# Patient Record
Sex: MALE | Race: WHITE | URBAN - METROPOLITAN AREA
[De-identification: names, ages, dates, MRNs, and addresses within clinical notes are randomized per-mention and may not be internally consistent; named-entity substitution may affect disease eponyms.]

---

## 2017-01-27 ASSESSMENT — ENCOUNTER SYMPTOMS
SYNCOPE: 0
ABDOMINAL PAIN: 0
DOUBLE VISION: 1
BLOATING: 0
CLAUDICATION: 1
SINUS PAIN: 0
ORTHOPNEA: 0
TROUBLE SWALLOWING: 0
SMELL DISTURBANCE: 0
EXERCISE INTOLERANCE: 0
EYE REDNESS: 0
CONSTIPATION: 1
PALPITATIONS: 0
CHILLS: 0
LEG SWELLING: 0
TREMORS: 0
SINUS CONGESTION: 1
LIGHT-HEADEDNESS: 1
TINGLING: 1
DIARRHEA: 0
EYE IRRITATION: 0
BOWEL INCONTINENCE: 0
HEADACHES: 1
LEG PAIN: 1
WEIGHT GAIN: 0
HYPOTENSION: 0
PARALYSIS: 0
DECREASED APPETITE: 0
NUMBNESS: 1
DIZZINESS: 1
WEAKNESS: 1
RECTAL BLEEDING: 0
TACHYCARDIA: 0
FATIGUE: 1
LOSS OF CONSCIOUSNESS: 0
MEMORY LOSS: 1
NIGHT SWEATS: 0
WEIGHT LOSS: 0
NECK MASS: 1
SEIZURES: 0
TASTE DISTURBANCE: 0
ALTERED TEMPERATURE REGULATION: 1
HYPERTENSION: 1
RECTAL PAIN: 1
POLYDIPSIA: 0
INCREASED ENERGY: 0
JAUNDICE: 0
DISTURBANCES IN COORDINATION: 0
SPEECH CHANGE: 0
FEVER: 0
HOARSE VOICE: 0
HALLUCINATIONS: 0
HEARTBURN: 1
BLOOD IN STOOL: 0
SLEEP DISTURBANCES DUE TO BREATHING: 0
EYE PAIN: 0
SORE THROAT: 0
VOMITING: 0
NAUSEA: 0
POLYPHAGIA: 0

## 2017-01-30 ENCOUNTER — OFFICE VISIT (OUTPATIENT)
Dept: NEUROLOGY | Facility: CLINIC | Age: 47
End: 2017-01-30

## 2017-01-30 VITALS
WEIGHT: 183 LBS | OXYGEN SATURATION: 97 % | SYSTOLIC BLOOD PRESSURE: 124 MMHG | BODY MASS INDEX: 27.74 KG/M2 | RESPIRATION RATE: 20 BRPM | DIASTOLIC BLOOD PRESSURE: 77 MMHG | HEART RATE: 58 BPM | HEIGHT: 68 IN

## 2017-01-30 DIAGNOSIS — G71.29 TUBULAR AGGREGATE MYOPATHY (H): ICD-10-CM

## 2017-01-30 DIAGNOSIS — K21.9 GASTROESOPHAGEAL REFLUX DISEASE WITHOUT ESOPHAGITIS: Primary | ICD-10-CM

## 2017-01-30 RX ORDER — PREDNISONE 10 MG/1
30 TABLET ORAL DAILY
Qty: 90 TABLET | Refills: 0 | Status: SHIPPED | OUTPATIENT
Start: 2017-01-30 | End: 2017-02-27

## 2017-01-30 ASSESSMENT — PAIN SCALES - GENERAL: PAINLEVEL: MODERATE PAIN (4)

## 2017-01-30 NOTE — MR AVS SNAPSHOT
After Visit Summary   1/30/2017    Derrick Andrade    MRN: [id number]           Patient Information     Date Of Birth          1970        Visit Information        Provider Department      1/30/2017 10:50 AM Bud Wade MD [facility]        Care Instructions    Prednisone 30mg for one month and then return to clinic for evaluation. Stop dantrolene now.  Follow up with Dr. Wade in 1 month.  If you have questions or concerns following today's appointment, please call Lisa Conway RN Care Coordinator, at [phone] (option 3).          Follow-ups after your visit        Your next 10 appointments already scheduled     Feb 27, 2017  9:50 AM   (Arrive by 9:35 AM)   Return Muscular Dystrophy with Bud Wade MD   [facility] ([facility])    [address] [place] [id number] [phone]              Who to contact     Please call your clinic at [phone] to:    Ask questions about your health    Make or cancel appointments    Discuss your medicines    Learn about your test results    Speak to your doctor   If you have compliments or concerns about an experience at your clinic, or if you wish to file a complaint, please contact [facility] Patient Relations at [phone] or email us at [email or web address]         Additional Information About Your Visit        MyChart Information     [employer] gives you secure access to your electronic health record. If you see a primary care provider, you can also send messages to your care team and make appointments. If you have questions, please call your primary care clinic.  If you do not have a primary care provider, please call [phone] and they will assist you.      [employer] is an electronic gateway that provides easy, online access to your medical records. With [employer], you can request a clinic appointment,  "read your test results, renew a prescription or communicate with your care team.     To access your existing account, please contact your [facility] or call [phone] for assistance.        Care EveryWhere ID     This is your Care EveryWhere ID. This could be used by other organizations to access your [place] medical records  [id number]        Your Vitals Were     Pulse Respirations Height BMI (Body Mass Index) Pulse Oximetry       58 20 1.727 m (5' 8\") 27.83 kg/m2 97%        Blood Pressure from Last 3 Encounters:   01/30/17 124/77   10/31/16 112/72    Weight from Last 3 Encounters:   01/30/17 83.008 kg (183 lb)   10/31/16 81.239 kg (179 lb 1.6 oz)              Today, you had the following     No orders found for display       Primary Care Provider Office Phone # Fax #    Kwabena Rain [phone]       [place] [address]        Thank you!     Thank you for choosing [facility]  for your care. Our goal is always to provide you with excellent care. Hearing back from our patients is one way we can continue to improve our services. Please take a few minutes to complete the written survey that you may receive in the mail after your visit with us. Thank you!             Your Updated Medication List - Protect others around you: Learn how to safely use, store and throw away your medicines at www.disposemymeds.org.          This list is accurate as of: 1/30/17 11:10 AM.  Always use your most recent med list.                   Brand Name Dispense Instructions for use    atenolol 25 MG tablet    TENORMIN     Take 1 tablet by mouth once       calcium carbonate-vitamin D 600-200 MG-UNIT Caps      Take 1 tablet by mouth daily as needed       dantrolene 25 MG capsule    DANTRIUM    90 capsule    Take 1 capsule daily for 1 week, then  1 capsule three times a day for 1 week, then 2 capsules three times a day       * " ibuprofen 800 MG tablet    ADVIL/MOTRIN     Take 1 tablet by mouth 3 times daily (before meals)       * ibuprofen 800 MG tablet    ADVIL/MOTRIN     Take 1 tablet by mouth 3 times daily (before meals)       LORazepam 1 MG tablet    ATIVAN    1 tablet    Take 1 tablet (1 mg) by mouth once as needed for anxiety Take 30 minutes prior to departure.  Do not operate a vehicle after taking this medication       simvastatin 10 MG tablet    ZOCOR     Take 1 tablet by mouth once       testosterone cypionate 200 MG/ML injection    DEPOTESTOTERONE CYPIONATE     Inject 200 mg into the muscle every 21 days       traMADol 50 MG tablet    ULTRAM     Take 50 mg by mouth 6 times daily       zolpidem 10 MG tablet    AMBIEN     Take 10 mg by mouth once       * Notice:  This list has 2 medication(s) that are the same as other medications prescribed for you. Read the directions carefully, and ask your doctor or other care provider to review them with you.

## 2017-01-30 NOTE — PROGRESS NOTES
Answers for HPI/ROS submitted by the patient on 1/27/2017   General Symptoms: Yes  Skin Symptoms: No  HENT Symptoms: Yes  EYE SYMPTOMS: Yes  HEART SYMPTOMS: Yes  LUNG SYMPTOMS: No  INTESTINAL SYMPTOMS: Yes  URINARY SYMPTOMS: No  REPRODUCTIVE SYMPTOMS: No  SKELETAL SYMPTOMS: Yes  BLOOD SYMPTOMS: No  NERVOUS SYSTEM SYMPTOMS: Yes  MENTAL HEALTH SYMPTOMS: No  Fever: No  Loss of appetite: No  Weight loss: No  Weight gain: No  Fatigue: Yes  Night sweats: No  Chills: No  Increased stress: No  Excessive hunger: No  Excessive thirst: No  Feeling hot or cold when others believe the temperature is normal: Yes  Loss of height: No  Post-operative complications: No  Surgical site pain: No  Hallucinations: No  Change in or Loss of Energy: No  Hyperactivity: No  Confusion: No  Ear pain: No  Ear discharge: No  Hearing loss: Yes  Tinnitus: Yes  Nosebleeds: No  Congestion: Yes  Sinus pain: No  Trouble swallowing: No   Voice hoarseness: No  Mouth sores: No  Sore throat: No  Tooth pain: No  Gum tenderness: No  Bleeding gums: Yes  Change in taste: No  Change in sense of smell: No  Dry mouth: No  Hearing aid used: No  Neck lump: Yes  Eye pain: No  Vision loss: No  Dry eyes: No  Eye bulging: No  Double vision: Yes  Flashing of lights: No  Spots: No  Floaters: Yes  Redness: No  Tunnel Vision: No  Yellowing of eyes: No  Eye irritation: No  Chest pain or pressure: Yes  Fast or irregular heartbeat: No  Pain in legs with walking: Yes  Swelling in feet or ankles: No  Trouble breathing while lying down: No  Fingers or Toes appear blue: No  High blood pressure: Yes  Low blood pressure: No  Fainting: No  Murmurs: No  Chest pain on exertion: No  Chest pain at rest: No  Cramping pain in leg during exercise: Yes  Pacemaker: No  Varicose veins: No  Edema or swelling: No  Fast heart beat: No  Wake up at night with shortness of breath: No  Heart flutters: No  Light-headedness: Yes  Exercise intolerance: No  Heart burn or indigestion: Yes  Nausea:  No  Vomiting: No  Abdominal pain: No  Bloating: No  Constipation: Yes  Diarrhea: No  Blood in stool: No  Black stools: No  Rectal or Anal pain: Yes  Fecal incontinence: No  Rectal bleeding: No  Yellowing of skin or eyes: No  Vomit with blood: No  Change in stools: No  Hemorrhoids: Yes  Trouble with coordination: No  Dizziness or trouble with balance: Yes  Fainting or black-out spells: No  Memory loss: Yes  Headache: Yes  Seizures: No  Speech problems: No  Tingling: Yes  Tremor: No  Weakness: Yes  Difficulty walking: No  Paralysis: No  Numbness: Yes

## 2017-01-30 NOTE — PROGRESS NOTES
2017        Kwabena Rain MD (Kion)   [facility]    [address] [place] [address]      RE: Derrick Andrade    MRN: [id number]   : 1970              Dear Dr. Rain:        I had the pleasure to see Mr. Andrade in followup at [facility] in the clinic today.  Mr. Andrade has a very longstanding history of muscle stiffness, cramps and diffuse body pain since age 14. He has tubular aggregates in his muscle biopsy.  There is no other condition found to explain his symptoms.  His muscle histopathology did not show any mitochondrial, lipid or glycogen storage myopathy.  He did not have any myotonia on repeated EMGs.  We did a long exercise test recently which showed a modest decrement-close to 40% of the ulnar CMAP but there were some technical issues making the result of questionable significance. He had genetic testing lately for conditions associated with periodic paralysis, tubular aggregates, and stiffness including AT, ZNOJL3Z, caveolin-3, KCNE3, KCNJ2, RYR1, ORAI1, SCN9A and STIM1 genes.  Those were all negative or normal.  Voltage gated potassium channel antibody was negative.  CK has been repeatedly normal in the past.     He tells me that he is about the same as before I prescribed him dantrolene a couple months ago.  He could not tolerate a dose more than 25 mg a day; when he took 25 mg b.i.d. he became very groggy or dizzy.  He is taking only 1 tablet a day and it really does not make any difference.  He has not tried steroids before.  He has been on many different medications over the previous years and worked with several pain clinics with generally unsatisfactory results.  Among previously failed medications are gabapentin, Lyrica, Cymbalta, amitriptyline, baclofen, etc.  He is taking tramadol 2-3 pills a day, which helps only a bit.     Medications were reviewed and are as per her Epic record.      On physical examination Mr. Andrade has a blood  pressure of 124/77, pulse 58 and regular, respiratory rate 20, weight is 183 pounds, height 5 feet 8 inches, BMI is 27.88.  Pain is 4 out of 10 and O2 saturation is 97% on room air.  Neuro exam is basically normal.  He has no weakness of deltoid, biceps, triceps, , hip flexion, knee extension, knee flexors or foot dorsiflexors.  He can get up from a chair without any difficulty.  He has no hand  or percussion myotonia.  He has no tin soldier appearance, spasms/hypertrophy of paraspinal muscles to suggest stiff-person syndrome.  He can touch his toes when bending over without difficulties. He does not startle.      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  I spent quite some time to explain to him that this is a sporadic condition as there is no family history and genetic testing was negative.  Unfortunately, there is no specific treatment available for it; I told him that tubular aggregates may be an innocent bystander as there are many people who have the exact same symptoms and their muscle biopsies look unremarkable.  I am not sure they play any role in his pain.  In any case, I asked him to stop dantrolene as he is taking a very low dose that doesn't help and he cannot tolerate higher ones.   One case report published in the early 1990s in Muscle & Nerve suggested efficacy of steroids. I will offer him prednisone 30 mg daily.  I had an extensive discussion with him regarding the side effects of steroids and he understands those.  I will prescribe him omeprazole for PUD prophylaxis.  He will return to clinic in 1 month.  If he does not improve, we will gradually taper off the steroids and refer him again to a Pain Clinic.  He has had constipation lately and I told him to take an over-the-counter stool softener.  Given the degree of constipation I am skeptical to prescribe him verapamil because this will make it worse and the evidence of efficacy  of calcium channel blockers for tubular aggregate myopathy is very limited.       TT spent for patient care 25 minutes; more than half was counseling.      Sincerely,        MD KAYLEE Glynn MD             D: 2017 11:14   T: 2017 12:08   MT: ale      Name:     CYNDEE ADAM   MRN:      [id number]        Account:      [id number]   :      1970           Service Date: 2017      Document: [id number]

## 2017-01-30 NOTE — Clinical Note
2017       RE: Derrick Andrade  [address]   [facility] [address]     Dear Colleague,    Thank you for referring your patient, Derrick Andrade, to the [facility] at [facility]. Please see a copy of my visit note below.    2017        Kwabena Rain MD (Kion)   [facility]    [address] [place] [address]     RE: Derrick Andrade    MRN: [id number]   : 1970        Dear Dr. Rain:        I had the pleasure to see Mr. Andrade in followup at [facility] in the clinic today.  Mr. Andrade has a very longstanding history of muscle stiffness, cramps and diffuse body pain since age 14. He has tubular aggregates in his muscle biopsy.  There is no other condition found to explain his symptoms.  His muscle histopathology did not show any mitochondrial, lipid or glycogen storage myopathy.  He did not have any myotonia on repeated EMGs.  We did a long exercise test recently which showed a modest decrement-close to 40% of the ulnar CMAP but there were some technical issues making the result of questionable significance. He had genetic testing lately for conditions associated with periodic paralysis, tubular aggregates, and stiffness including AT, XZZVE5P, caveolin-3, KCNE3, KCNJ2, RYR1, ORAI1, SCN9A and STIM1 genes.  Those were all negative or normal.  Voltage gated potassium channel antibody was negative.  CK has been repeatedly normal in the past.     He tells me that he is about the same as before I prescribed him dantrolene a couple months ago.  He could not tolerate a dose more than 25 mg a day; when he took 25 mg b.i.d. he became very groggy or dizzy.  He is taking only 1 tablet a day and it really does not make any difference.  He has not tried steroids before.  He has been on many different medications over the previous years and worked with several pain clinics with generally unsatisfactory results.  Among  previously failed medications are gabapentin, Lyrica, Cymbalta, amitriptyline, baclofen, etc.  He is taking tramadol 2-3 pills a day, which helps only a bit.     Medications were reviewed and are as per her Epic record.      On physical examination Mr. Andrade has a blood pressure of 124/77, pulse 58 and regular, respiratory rate 20, weight is 183 pounds, height 5 feet 8 inches, BMI is 27.88.  Pain is 4 out of 10 and O2 saturation is 97% on room air.  Neuro exam is basically normal.  He has no weakness of deltoid, biceps, triceps, , hip flexion, knee extension, knee flexors or foot dorsiflexors.  He can get up from a chair without any difficulty.  He has no hand  or percussion myotonia.  He has no tin soldier appearance, spasms/hypertrophy of paraspinal muscles to suggest stiff-person syndrome.  He can touch his toes when bending over without difficulties. He does not startle.      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  I spent quite some time to explain to him that this is a sporadic condition as there is no family history and genetic testing was negative.  Unfortunately, there is no specific treatment available for it; I told him that tubular aggregates may be an innocent bystander as there are many people who have the exact same symptoms and their muscle biopsies look unremarkable.  I am not sure they play any role in his pain.  In any case, I asked him to stop dantrolene as he is taking a very low dose that doesn't help and he cannot tolerate higher ones.   One case report published in the early 1990s in Muscle & Nerve suggested efficacy of steroids. I will offer him prednisone 30 mg daily.  I had an extensive discussion with him regarding the side effects of steroids and he understands those.  I will prescribe him omeprazole for PUD prophylaxis.  He will return to clinic in 1 month.  If he does not improve, we will gradually taper off the  steroids and refer him again to a Pain Clinic.  He has had constipation lately and I told him to take an over-the-counter stool softener.  Given the degree of constipation I am skeptical to prescribe him verapamil because this will make it worse and the evidence of efficacy of calcium channel blockers for tubular aggregate myopathy is very limited.       TT spent for patient care 25 minutes; more than half was counseling.      Sincerely,        Bud Wade MD     D: 2017 11:14   T: 2017 12:08   MT: ale      Name:     CYNDEE ADAM   MRN:      [id number]        Account:      [id number]   :      1970           Service Date: 2017      Document: [id number]

## 2017-01-30 NOTE — PATIENT INSTRUCTIONS
Prednisone 30mg for one month and then return to clinic for evaluation. Stop dantrolene now.  Follow up with Dr. Wade in 1 month.  If you have questions or concerns following today's appointment, please call Lisa Conway RN Care Coordinator, at [phone] (option 3).

## 2017-02-03 DIAGNOSIS — M62.89 MUSCLE STIFFNESS: Primary | ICD-10-CM

## 2017-02-03 RX ORDER — DANTROLENE SODIUM 25 MG/1
CAPSULE ORAL
Qty: 180 CAPSULE | Refills: 1
Start: 2017-02-03

## 2017-02-24 ASSESSMENT — ENCOUNTER SYMPTOMS
EYE IRRITATION: 0
DECREASED CONCENTRATION: 1
TREMORS: 0
NERVOUS/ANXIOUS: 1
STIFFNESS: 1
SINUS CONGESTION: 0
HEADACHES: 1
DOUBLE VISION: 1
DISTURBANCES IN COORDINATION: 0
PARALYSIS: 0
SLEEP DISTURBANCES DUE TO BREATHING: 0
WEIGHT GAIN: 1
NECK MASS: 1
EYE REDNESS: 0
SPEECH CHANGE: 0
POLYPHAGIA: 0
PANIC: 0
NUMBNESS: 1
MEMORY LOSS: 0
SMELL DISTURBANCE: 0
DIZZINESS: 1
EXERCISE INTOLERANCE: 1
HOARSE VOICE: 0
TACHYCARDIA: 1
SYNCOPE: 0
SINUS PAIN: 0
HYPERTENSION: 0
INSOMNIA: 1
FATIGUE: 1
CLAUDICATION: 1
ARTHRALGIAS: 1
MUSCLE CRAMPS: 0
FEVER: 0
TROUBLE SWALLOWING: 0
NECK PAIN: 1
MYALGIAS: 1
ALTERED TEMPERATURE REGULATION: 1
DECREASED APPETITE: 0
MUSCLE WEAKNESS: 1
TASTE DISTURBANCE: 0
LIGHT-HEADEDNESS: 1
JOINT SWELLING: 0
EYE PAIN: 0
CHILLS: 0
ORTHOPNEA: 0
INCREASED ENERGY: 0
EYE WATERING: 0
PALPITATIONS: 1
NIGHT SWEATS: 1
TINGLING: 1
SEIZURES: 0
HYPOTENSION: 0
LOSS OF CONSCIOUSNESS: 0
WEIGHT LOSS: 0
WEAKNESS: 1
LEG SWELLING: 0
SORE THROAT: 0
POLYDIPSIA: 0
LEG PAIN: 1

## 2017-02-27 ENCOUNTER — OFFICE VISIT (OUTPATIENT)
Dept: NEUROLOGY | Facility: CLINIC | Age: 47
End: 2017-02-27

## 2017-02-27 VITALS
BODY MASS INDEX: 27.04 KG/M2 | HEIGHT: 68 IN | WEIGHT: 178.4 LBS | HEART RATE: 63 BPM | SYSTOLIC BLOOD PRESSURE: 131 MMHG | DIASTOLIC BLOOD PRESSURE: 80 MMHG

## 2017-02-27 DIAGNOSIS — G71.29 TUBULAR AGGREGATE MYOPATHY (H): ICD-10-CM

## 2017-02-27 RX ORDER — PREDNISONE 10 MG/1
TABLET ORAL
Qty: 21 TABLET | Refills: 0 | Status: SHIPPED | OUTPATIENT
Start: 2017-02-27 | End: 2022-06-02

## 2017-02-27 NOTE — PATIENT INSTRUCTIONS
Prednisone: Reduce to 20mg daily for 1 week, then 10mg daily for 1 week, then stop  Medical Canibus Approval by Dr. Wade will be provided.  Call us in 1-2 months to update us on how this is working for you.  Follow up with Dr. Wade as needed.  If you have questions or concerns following today's appointment, please call Lisa Conway RN Care Coordinator, at [phone] (option 3).

## 2017-02-27 NOTE — PROGRESS NOTES
Answers for HPI/ROS submitted by the patient on 2/24/2017   General Symptoms: Yes  Skin Symptoms: No  HENT Symptoms: Yes  EYE SYMPTOMS: Yes  HEART SYMPTOMS: Yes  LUNG SYMPTOMS: No  INTESTINAL SYMPTOMS: No  URINARY SYMPTOMS: No  REPRODUCTIVE SYMPTOMS: No  SKELETAL SYMPTOMS: Yes  BLOOD SYMPTOMS: No  NERVOUS SYSTEM SYMPTOMS: Yes  MENTAL HEALTH SYMPTOMS: Yes  Fever: No  Loss of appetite: No  Weight loss: No  Weight gain: Yes  Fatigue: Yes  Night sweats: Yes  Chills: No  Increased stress: No  Excessive hunger: No  Excessive thirst: No  Feeling hot or cold when others believe the temperature is normal: Yes  Loss of height: No  Post-operative complications: No  Surgical site pain: No  Change in or Loss of Energy: No  Hyperactivity: No  Confusion: No  Ear pain: No  Ear discharge: No  Hearing loss: Yes  Tinnitus: Yes  Nosebleeds: No  Congestion: No  Sinus pain: No  Trouble swallowing: No   Voice hoarseness: No  Mouth sores: No  Sore throat: No  Tooth pain: No  Gum tenderness: Yes  Bleeding gums: Yes  Change in taste: No  Change in sense of smell: No  Dry mouth: No  Hearing aid used: No  Neck lump: Yes  Eye pain: No  Vision loss: No  Dry eyes: No  Watery eyes: No  Eye bulging: No  Double vision: Yes  Flashing of lights: No  Spots: No  Floaters: Yes  Redness: No  Crossed eyes: No  Tunnel Vision: No  Yellowing of eyes: No  Eye irritation: No  Chest pain or pressure: No  Fast or irregular heartbeat: Yes  Pain in legs with walking: Yes  Swelling in feet or ankles: No  Trouble breathing while lying down: No  Fingers or Toes appear blue: No  High blood pressure: No  Low blood pressure: No  Fainting: No  Murmurs: No  Chest pain on exertion: No  Chest pain at rest: No  Cramping pain in leg during exercise: Yes  Pacemaker: No  Varicose veins: No  Edema or swelling: No  Fast heart beat: Yes  Wake up at night with shortness of breath: No  Heart flutters: No  Light-headedness: Yes  Exercise intolerance: Yes  Muscle aches: Yes  Neck pain:  Yes  Swollen joints: No  Joint pain: Yes  Muscle cramps: No  Muscle weakness: Yes  Joint stiffness: Yes  Bone fracture: No  Trouble with coordination: No  Dizziness or trouble with balance: Yes  Fainting or black-out spells: No  Memory loss: No  Headache: Yes  Seizures: No  Speech problems: No  Tingling: Yes  Tremor: No  Weakness: Yes  Difficulty walking: No  Paralysis: No  Numbness: Yes  Nervous or Anxious: Yes  Trouble sleeping: Yes  Trouble thinking or concentrating: Yes  Mood changes: Yes  Panic attacks: No

## 2017-02-27 NOTE — LETTER
2017       RE: Derrick Andrade  [address]   [facility] [address]     Dear Colleague,    Thank you for referring your patient, Derrick Andrade, to the [facility]UR[facility] at [facility]. Please see a copy of my visit note below.    2017             Kwabena Rain MD (Kion)   [facility]    [address] [place] [id number]      RE: Derrick Andrade   MRN: [id number]   : 1970      Dear Dr. Rain:      I had the pleasure to see Mr. Andrade in followup at the [facility] today.  He has a very long-standing history of muscle stiffness, cramps and diffuse body pain since age 14.  His muscle biopsy showed tubular aggregates.  Extensive genetic workup for hereditary cause of tubular aggregates, muscle stiffness or periodic paralysis was negative.  EMGs have repeatedly not disclosed any myopathy or myotonia.  Long exercise test showed a modest decrement close to 40% of the ulnar CMAP, but technical issues precluded accurate interpretation of the test.  He has been treated in multiple pain clinics, and he also has been on multiple medications over time for his pain with unsatisfactory results, poor tolerance and many side effects.  I offered him dantrolene in late .  He tried 25 mg daily.  He could not increase the dose because of dizziness.  It did not make any difference.  I offered him prednisone 30 mg daily, which he began a month ago.  This has produced minimal improvement, and he has irritability and insomnia that are worse than the actual muscle pain and stiffness.  He otherwise does not have any new neurological symptoms.        I did not repeat a physical examination.  Blood pressure is 131/80.  Pulse is 63 and regular.  His weight is 80.9 kg, height is 172, and BMI is 27.1.        In summary, Mr. 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   Unfortunately, there is no specific treatment available, and treating the tubular aggregates won't necessarily treat the pain here.  He could not tolerate dantrolene, and steroids at 30 mg daily did not help.  I do not want to try him on calcium channel blockers, like verapamil, because he has baseline constipation.  At this point, treatment options are very limited.  I will certify him for the medical cannabis program in [place] at his request.  I encouraged him to try this program for about 3 months.  If there is no effect, he should drop it.  If there is no effect, I encouraged him to continue working with his Primary Care provider and a Pain Clinic expert locally.  I am afraid I do not have any additional ways to help.  I reassured him that since his muscle strength remains 5/5, 20+ years after the onset of symptoms, it is unlikely he will develop incapacitating muscle weakness in the years to come; he was reassured to hear that.  Follow up p.r.n.   TT spent for patient care 10 minutes; more than half was counseling.     Sincerely,      Bud Wade MD     D: 2017 09:47   T: 2017 14:11   MT: amadou      Name:     CYNDEE ADAM   MRN:      [id number]        Account:      [id number]   :      1970           Service Date: 2017      Document: [id number]

## 2017-02-27 NOTE — PROGRESS NOTES
2017             Kwabena Rain MD ([place])   [address] [address]      RE: Derrick Andrade   MRN: [id number]   : 1970      Dear Dr. Rain:      I had the pleasure to see Mr. Andrade in followup at the [facility] today.  He has a very long-standing history of muscle stiffness, cramps and diffuse body pain since age 14.  His muscle biopsy showed tubular aggregates.  Extensive genetic workup for hereditary cause of tubular aggregates, muscle stiffness or periodic paralysis was negative.  EMGs have repeatedly not disclosed any myopathy or myotonia.  Long exercise test showed a modest decrement close to 40% of the ulnar CMAP, but technical issues precluded accurate interpretation of the test.  He has been treated in multiple pain clinics, and he also has been on multiple medications over time for his pain with unsatisfactory results, poor tolerance and many side effects.  I offered him dantrolene in late .  He tried 25 mg daily.  He could not increase the dose because of dizziness.  It did not make any difference.  I offered him prednisone 30 mg daily, which he began a month ago.  This has produced minimal improvement, and he has irritability and insomnia that are worse than the actual muscle pain and stiffness.  He otherwise does not have any new neurological symptoms.        I did not repeat a physical examination.  Blood pressure is 131/80.  Pulse is 63 and regular.  His weight is 80.9 kg, height is 172, and BMI is 27.1.        In summary, Mr. 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  Unfortunately, there is no specific treatment available, and treating the tubular aggregates won't necessarily treat the pain here.  He could not tolerate dantrolene, and steroids at 30 mg daily did not help.  I do not want to try him on calcium channel blockers, like verapamil,  because he has baseline constipation.  At this point, treatment options are very limited.  I will certify him for the medical cannabis program in [place] at his request.  I encouraged him to try this program for about 3 months.  If there is no effect, he should drop it.  If there is no effect, I encouraged him to continue working with his Primary Care provider and a Pain Clinic expert locally.  I am afraid I do not have any additional ways to help.  I reassured him that since his muscle strength remains 5/5, 20+ years after the onset of symptoms, it is unlikely he will develop incapacitating muscle weakness in the years to come; he was reassured to hear that.  Follow up p.r.n.   TT spent for patient care 10 minutes; more than half was counseling.     Sincerely,      MD KAYLEE Arreaga MD             D: 2017 09:47   T: 2017 14:11   MT: [email or web address]      Name:     CYNDEE ADAM   MRN:      [id number]        Account:      [id number]   :      1970           Service Date: 2017      Document: [id number]

## 2017-02-27 NOTE — MR AVS SNAPSHOT
After Visit Summary   2/27/2017    Derrick Andrade    MRN: [id number]           Patient Information     Date Of Birth          1970        Visit Information        Provider Department      2/27/2017 9:50 AM Bud Wade MD [facility] Neu[facility]        Care Instructions    Prednisone: Reduce to 20mg daily for 1 week, then 10mg daily for 1 week, then stop  Medical Canibus Approval by Dr. Wade will be provided.  Call us in 1-2 months to update us on how this is working for you.  Follow up with Dr. Wade as needed.  If you have questions or concerns following today's appointment, please call Lisa Conway, RN Care Coordinator, at [phone] (option 3).          Follow-ups after your visit        Your next 10 appointments already scheduled     Feb 27, 2017  9:50 AM CST   (Arrive by 9:35 AM)   Return Muscular Dystrophy with Bud Wade MD   [facility] ([facility])    [address] [place] [address] [phone]              Who to contact     Please call your clinic at [phone] to:    Ask questions about your health    Make or cancel appointments    Discuss your medicines    Learn about your test results    Speak to your doctor   If you have compliments or concerns about an experience at your clinic, or if you wish to file a complaint, please contact [facility] Patient Relations at [phone] or email us at [email or web address]         Additional Information About Your Visit        MyChart Information     [employer] gives you secure access to your electronic health record. If you see a primary care provider, you can also send messages to your care team and make appointments. If you have questions, please call your primary care clinic.  If you do not have a primary care provider, please call [phone] and they will assist you.      [employer] is an electronic  "gateway that provides easy, online access to your medical records. With [employer], you can request a clinic appointment, read your test results, renew a prescription or communicate with your care team.     To access your existing account, please contact your [facility] or call [phone] for assistance.        Care EveryWhere ID     This is your Care EveryWhere ID. This could be used by other organizations to access your [place] medical records  [id number]        Your Vitals Were     Pulse Height BMI (Body Mass Index)             63 1.727 m (5' 8\") 27.13 kg/m2          Blood Pressure from Last 3 Encounters:   02/27/17 131/80   01/30/17 124/77   10/31/16 112/72    Weight from Last 3 Encounters:   02/27/17 80.9 kg (178 lb 6.4 oz)   01/30/17 83 kg (183 lb)   10/31/16 81.2 kg (179 lb 1.6 oz)              Today, you had the following     No orders found for display       Primary Care Provider Office Phone # Fax #    Kwabena Jermaine Rain [phone]       [place] [address]        Thank you!     Thank you for choosing [facility]UROLOG[facility]  for your care. Our goal is always to provide you with excellent care. Hearing back from our patients is one way we can continue to improve our services. Please take a few minutes to complete the written survey that you may receive in the mail after your visit with us. Thank you!             Your Updated Medication List - Protect others around you: Learn how to safely use, store and throw away your medicines at www.disposemymeds.org.          This list is accurate as of: 2/27/17  9:41 AM.  Always use your most recent med list.                   Brand Name Dispense Instructions for use    atenolol 25 MG tablet    TENORMIN     Take 1 tablet by mouth once       calcium carbonate-vitamin D 600-200 MG-UNIT Caps      Take 1 tablet by mouth daily as needed       dantrolene 25 MG capsule    DANTRIUM    " 90 capsule    Take 1 capsule daily for 1 week, then  1 capsule three times a day for 1 week, then 2 capsules three times a day       * ibuprofen 800 MG tablet    ADVIL/MOTRIN     Take 1 tablet by mouth 3 times daily (before meals)       * ibuprofen 800 MG tablet    ADVIL/MOTRIN     Take 1 tablet by mouth 3 times daily (before meals)       LORazepam 1 MG tablet    ATIVAN    1 tablet    Take 1 tablet (1 mg) by mouth once as needed for anxiety Take 30 minutes prior to departure.  Do not operate a vehicle after taking this medication       omeprazole 20 MG CR capsule    priLOSEC    30 capsule    Take 1 capsule (20 mg) by mouth daily       predniSONE 10 MG tablet    DELTASONE    90 tablet    Take 3 tablets (30 mg) by mouth daily       simvastatin 10 MG tablet    ZOCOR     Take 1 tablet by mouth once       testosterone cypionate 200 MG/ML injection    DEPOTESTOTERONE CYPIONATE     Inject 200 mg into the muscle every 21 days       traMADol 50 MG tablet    ULTRAM     Take 50 mg by mouth 6 times daily       zolpidem 10 MG tablet    AMBIEN     Take 10 mg by mouth once       * Notice:  This list has 2 medication(s) that are the same as other medications prescribed for you. Read the directions carefully, and ask your doctor or other care provider to review them with you.

## 2017-03-26 DIAGNOSIS — K21.9 GASTROESOPHAGEAL REFLUX DISEASE WITHOUT ESOPHAGITIS: ICD-10-CM

## 2017-03-27 NOTE — TELEPHONE ENCOUNTER
Omeprazole was supposed to be taken for ulcer prophylaxis while patient was on steroids. We stopped the steroids so he does not need it any more. Thanks

## 2018-01-24 ENCOUNTER — DOCUMENTATION ONLY (OUTPATIENT)
Dept: FAMILY MEDICINE | Facility: OTHER | Age: 48
End: 2018-01-24

## 2018-01-24 PROBLEM — Z79.891 LONG TERM CURRENT USE OF OPIATE ANALGESIC: Status: ACTIVE | Noted: 2017-01-06

## 2018-03-14 ENCOUNTER — TRANSFERRED RECORDS (OUTPATIENT)
Dept: HEALTH INFORMATION MANAGEMENT | Facility: CLINIC | Age: 48
End: 2018-03-14

## 2018-06-14 ENCOUNTER — TRANSFERRED RECORDS (OUTPATIENT)
Dept: HEALTH INFORMATION MANAGEMENT | Facility: CLINIC | Age: 48
End: 2018-06-14

## 2018-07-02 ENCOUNTER — TRANSFERRED RECORDS (OUTPATIENT)
Dept: HEALTH INFORMATION MANAGEMENT | Facility: CLINIC | Age: 48
End: 2018-07-02

## 2018-09-10 ENCOUNTER — TELEPHONE (OUTPATIENT)
Dept: NEUROLOGY | Facility: CLINIC | Age: 48
End: 2018-09-10

## 2018-09-10 NOTE — TELEPHONE ENCOUNTER
[facility]    Phone Message    May a detailed message be left on voicemail: yes    Reason for Call: Other: Derrick called to ask if there was any way Dr. Wade could see him on Oct. 3. I looked at the doctors schedule and he is full. Derrick says he comes from 4 hours away, and his wife will be here for surgery that day. He is hoping he could get in instead of having to make the drive twice within a couple weeks. He said he understands if Dr. BAUM is busy, but please let him know if this would be a possibility. Thank you.     Action Taken: Message routed to:  [facility] ([facility] Neurology

## 2018-10-11 ENCOUNTER — OFFICE VISIT (OUTPATIENT)
Dept: NEUROLOGY | Facility: CLINIC | Age: 48
End: 2018-10-11
Payer: COMMERCIAL

## 2018-10-11 VITALS
WEIGHT: 177 LBS | HEIGHT: 68 IN | TEMPERATURE: 97.7 F | BODY MASS INDEX: 26.83 KG/M2 | HEART RATE: 52 BPM | DIASTOLIC BLOOD PRESSURE: 76 MMHG | OXYGEN SATURATION: 97 % | SYSTOLIC BLOOD PRESSURE: 120 MMHG

## 2018-10-11 DIAGNOSIS — G56.03 BILATERAL CARPAL TUNNEL SYNDROME: Primary | ICD-10-CM

## 2018-10-11 DIAGNOSIS — H53.8 BLURRED VISION: ICD-10-CM

## 2018-10-11 DIAGNOSIS — R20.0 BILATERAL HAND NUMBNESS: ICD-10-CM

## 2018-10-11 DIAGNOSIS — G71.29 TUBULAR AGGREGATE MYOPATHY (H): ICD-10-CM

## 2018-10-11 RX ORDER — TESTOSTERONE CYPIONATE 200 MG/ML
200 INJECTION, SOLUTION INTRAMUSCULAR
COMMUNITY
Start: 2017-12-12 | End: 2018-12-04

## 2018-10-11 RX ORDER — TRAMADOL HYDROCHLORIDE 50 MG/1
100 TABLET ORAL
COMMUNITY
Start: 2018-08-25

## 2018-10-11 RX ORDER — OXYCODONE HYDROCHLORIDE 5 MG/1
5 TABLET ORAL PRN
COMMUNITY
Start: 2018-08-28

## 2018-10-11 RX ORDER — LEVOTHYROXINE SODIUM 150 UG/1
TABLET ORAL
Refills: 3 | COMMUNITY
Start: 2018-08-16

## 2018-10-11 ASSESSMENT — ENCOUNTER SYMPTOMS
VOMITING: 0
DYSURIA: 0
TINGLING: 1
TASTE DISTURBANCE: 0
DIFFICULTY URINATING: 1
MYALGIAS: 1
HEARTBURN: 0
BLOATING: 0
SMELL DISTURBANCE: 0
NAUSEA: 0
WEAKNESS: 1
PARALYSIS: 0
EYE IRRITATION: 0
LEG PAIN: 1
MEMORY LOSS: 1
HEMATURIA: 0
RECTAL PAIN: 0
HYPOTENSION: 0
SEIZURES: 0
STIFFNESS: 1
EXERCISE INTOLERANCE: 0
LOSS OF CONSCIOUSNESS: 0
SINUS CONGESTION: 0
BLOOD IN STOOL: 0
PALPITATIONS: 0
DIZZINESS: 0
DISTURBANCES IN COORDINATION: 0
MUSCLE WEAKNESS: 1
HOARSE VOICE: 0
SORE THROAT: 0
JOINT SWELLING: 0
NECK MASS: 0
HEADACHES: 1
SYNCOPE: 0
EYE REDNESS: 0
TREMORS: 0
ABDOMINAL PAIN: 0
SINUS PAIN: 0
MUSCLE CRAMPS: 1
SLEEP DISTURBANCES DUE TO BREATHING: 0
FLANK PAIN: 0
EYE PAIN: 1
ARTHRALGIAS: 1
HYPERTENSION: 0
JAUNDICE: 0
DIARRHEA: 0
NECK PAIN: 1
SPEECH CHANGE: 0
BOWEL INCONTINENCE: 0
DOUBLE VISION: 1
LIGHT-HEADEDNESS: 1
NUMBNESS: 1
EYE WATERING: 0
CONSTIPATION: 1
BACK PAIN: 1

## 2018-10-11 ASSESSMENT — PAIN SCALES - GENERAL: PAINLEVEL: MODERATE PAIN (5)

## 2018-10-11 NOTE — MR AVS SNAPSHOT
After Visit Summary   10/11/2018    Derrick Andrade    MRN: [id number]           Patient Information     Date Of Birth          1970        Visit Information        Provider Department      10/11/2018 1:00 PM Bud Wade MD [facility] EMG        Today's Diagnoses     Bilateral carpal tunnel syndrome    -  1    Bilateral hand numbness           Follow-ups after your visit        Your next 10 appointments already scheduled     Oct 22, 2018  1:30 PM CDT   NEW NEURO with Jose Angel Glasgow MD   Eye Clinic ([facility])    [address] [facility] [address] [place] [id number] [phone]              Who to contact     Please call your clinic at [phone] to:    Ask questions about your health    Make or cancel appointments    Discuss your medicines    Learn about your test results    Speak to your doctor            Additional Information About Your Visit        MyChart Information     [employer] gives you secure access to your electronic health record. If you see a primary care provider, you can also send messages to your care team and make appointments. If you have questions, please call your primary care clinic.  If you do not have a primary care provider, please call [phone] and they will assist you.      [employer] is an electronic gateway that provides easy, online access to your medical records. With [employer] you can request a clinic appointment, read your test results, renew a prescription or communicate with your care team.     To access your existing account, please contact your [facility] or call [phone] for assistance.        Care EveryWhere ID     This is your Care EveryWhere ID. This could be used by other organizations to access your [place] medical records  [id number]         Blood Pressure from Last 3 Encounters:   10/11/18 120/76   02/27/17 131/80   01/30/17 124/77    Weight from  Last 3 Encounters:   10/11/18 80.3 kg (177 lb)   02/27/17 80.9 kg (178 lb 6.4 oz)   01/30/17 83 kg (183 lb)              We Performed the Following     EMG     HC NCS MOTOR W OR W/O F-WAVE, 7 OR 8        Primary Care Provider Office Phone # Fax #    Kwabena Rain [phone]       [facility] [address] [facility] [address]        Equal Access to Services     DAVID BRAVO : Hadii aad ku hadasho Soomaali, waaxda luqadaha, qaybta kaalmada adeegyada, waxay idiin hayaan adeeg taryn scherer. So [facility] [phone].    ATENCIÓN: Si habla español, tiene a mosqueda disposición servicios gratuitos de asistencia lingüística. [facility] [phone].    We comply with applicable federal civil rights laws and [place] laws. We do not discriminate on the basis of race, color, national origin, age, disability, sex, sexual orientation, or gender identity.            Thank you!     Thank you for choosing [facility]  for your care. Our goal is always to provide you with excellent care. Hearing back from our patients is one way we can continue to improve our services. Please take a few minutes to complete the written survey that you may receive in the mail after your visit with us. Thank you!             Your Updated Medication List - Protect others around you: Learn how to safely use, store and throw away your medicines at www.disposemymeds.org.          This list is accurate as of 10/11/18  1:26 PM.  Always use your most recent med list.                   Brand Name Dispense Instructions for use Diagnosis    atenolol 25 MG tablet    TENORMIN     Take 1 tablet by mouth once        calcium carbonate-vitamin D 600-200 MG-UNIT Caps      Take 1 tablet by mouth daily as needed        dantrolene 25 MG capsule    DANTRIUM    90 capsule    Take 1 capsule daily for 1 week, then  1 capsule three times a day for 1 week, then 2 capsules three times a day    Muscle stiffness       * ibuprofen 800 MG  tablet    ADVIL/MOTRIN     Take 1 tablet by mouth 3 times daily (before meals)        * ibuprofen 800 MG tablet    ADVIL/MOTRIN     Take 1 tablet by mouth 3 times daily (before meals)        levothyroxine 150 MCG tablet    SYNTHROID/LEVOTHROID     TK 1 T PO D.        LORazepam 1 MG tablet    ATIVAN    1 tablet    Take 1 tablet (1 mg) by mouth once as needed for anxiety Take 30 minutes prior to departure.  Do not operate a vehicle after taking this medication    Needle phobia       omeprazole 20 MG CR capsule    priLOSEC    30 capsule    Take 1 capsule (20 mg) by mouth daily    Gastroesophageal reflux disease without esophagitis       oxyCODONE IR 5 MG tablet    ROXICODONE     Take 5 mg by mouth as needed        predniSONE 10 MG tablet    DELTASONE    21 tablet    Take 20mg daily for 1 week, then 10mg for 1 week, then stop.    Tubular aggregate myopathy       simvastatin 10 MG tablet    ZOCOR     Take 1 tablet by mouth once        * testosterone cypionate 200 MG/ML injection    DEPOTESTOSTERONE     Inject 200 mg into the muscle every 21 days        * testosterone cypionate 200 MG/ML injection    DEPOTESTOSTERONE     Inject 200 mg into the muscle        * traMADol 50 MG tablet    ULTRAM     Take 50 mg by mouth 6 times daily        * traMADol 50 MG tablet    ULTRAM     Take 100 mg by mouth        zolpidem 10 MG tablet    AMBIEN     Take 10 mg by mouth once        * Notice:  This list has 6 medication(s) that are the same as other medications prescribed for you. Read the directions carefully, and ask your doctor or other care provider to review them with you.

## 2018-10-11 NOTE — LETTER
10/11/2018       RE: Derrick Andrade  [address]  [facility] [address]     Dear Colleague,    Thank you for referring your patient, Derrick Andrade, to the [facility] at [facility]. Please see a copy of my visit note below.        [facility]  Electrodiagnostic Laboratory    Nerve Conduction & EMG Report          Patient:       Derrick Andrade  Patient ID:    [id number]  Gender:        Male  YOB: 1970  Age:           48 Years 1 Months      Referring Physician: Kwabena Rain MD    History & Examination:    48 year old man with bilateral hand numbness. Query carpal tunnel syndrome.     Techniques: Motor and sensory conduction studies were done with surface recording electrodes. Temperature was monitored and recorded throughout the study. Upper extremities were maintained at a temperature of 32 degrees Centigrade or higher.      Results:    Bilateral median antidromic sensory NCSs and orthodromic mixed NCSs, the latter done by stimulation at the palm, showed either attenuated conduction velocities, or prolonged peak latencies, right>left, and normal SNAP amplitudes. Bilateral ulnar antidromic sensory and orthodromic mixed NCSs were normal. Right median motor NCS showed prolonged distal latency, normal CMAP amplitudes, and conduction velocity. Left median and bilateral ulnar motor NCSs were normal. Needle EMG was deferred (reason: not necessary to answer referral question).    Interpretation:    Abnormal study. There is electrodiagnostic evidence of bilateral median neuropathies at the wrist, as seen in carpal tunnel syndrome, moderate on the right, and mild on the left.    EMG Physician:    Bud Wade MD       Sensory NCS      Nerve / Sites Rec. Site Onset Peak Ref. NP Amp Ref. PP Amp Dist Chip Ref. Temp     ms ms ms  V  V  V cm m/s m/s  C   R MEDIAN - Dig II Anti      Wrist Dig II 3.33 4.48  25.0 10.0 30.6 14 42.0 48.0 32.5   L MEDIAN - Dig II  Anti      Wrist Dig II 3.23 4.11  28.2 10.0 48.6 14 43.4 48.0 32.3   R ULNAR - Dig V Anti      Wrist Dig V 2.40 3.44  25.3 8.0 41.0 12.5 52.2 48.0 32.5   L ULNAR - Dig V Anti      Wrist Dig V 2.50 3.44  28.2 8.0 41.3 12.5 50.0 48.0 32.5   R MEDIAN - Ulnar - Palmar      Median Wrist 2.14 2.76 2.40 28.3  35.7 8 37.5  32.8      Ulnar Wrist 1.56 2.14 2.40 18.2  26.6 8 51.2  32.8   L MEDIAN - Ulnar - Palmar      Median Wrist 1.93 2.50 2.40 18.9  55.7 8 41.5  31.5      Ulnar Wrist 1.46 2.03 2.40 26.3  23.9 8 54.9  31.3       Motor NCS      Nerve / Sites Rec. Site Lat Ref. Amp Ref. Rel Amp Dist Chip Ref. Dur. Area Temp.     ms ms mV mV % cm m/s m/s ms %  C   R MEDIAN - APB      Wrist APB 5.10 4.40 15.5 5.0 100 8   6.51 100 32.7      Elbow APB 9.22  14.9  95.8 24 58.3 48.0 6.77 92.2 32.7   L MEDIAN - APB      Wrist APB 3.96 4.40 17.0 5.0 100 8   5.78 100 32.6      Elbow APB 7.92  17.4  102 23.5 59.4 48.0 6.09 103 33   R ULNAR - ADM      Wrist ADM 3.13 3.50 14.7 5.0 100 8   6.51 100 32.4      B.Elbow ADM 6.67  14.4  97.9 20 56.5 48.0 6.61 95.7 32.4      A.Elbow ADM 8.75  14.0  95.6 11 52.8 48.0 6.82 93.5 32.5   L ULNAR - ADM      Wrist ADM 3.13 3.50 10.8 5.0 100 8   6.93 100 31.6      B.Elbow ADM 6.25  10.5  98 19 60.8 48.0 7.24 97.4 31.6      A.Elbow ADM 8.28  10.8  100 11 54.2 48.0 7.24 96.3 31.6                                Again, thank you for allowing me to participate in the care of your patient.      Sincerely,    Bud Wade MD

## 2018-10-11 NOTE — PROGRESS NOTES
[facility]  Electrodiagnostic Laboratory    Nerve Conduction & EMG Report          Patient:       Derrick Andrade  Patient ID:    [id number]  Gender:        Male  YOB: 1970  Age:           48 Years 1 Months      Referring Physician: Kwabena Rain MD    History & Examination:    48 year old man with bilateral hand numbness. Query carpal tunnel syndrome.     Techniques: Motor and sensory conduction studies were done with surface recording electrodes. Temperature was monitored and recorded throughout the study. Upper extremities were maintained at a temperature of 32 degrees Centigrade or higher.      Results:    Bilateral median antidromic sensory NCSs and orthodromic mixed NCSs, the latter done by stimulation at the palm, showed either attenuated conduction velocities, or prolonged peak latencies, right>left, and normal SNAP amplitudes. Bilateral ulnar antidromic sensory and orthodromic mixed NCSs were normal. Right median motor NCS showed prolonged distal latency, normal CMAP amplitudes, and conduction velocity. Left median and bilateral ulnar motor NCSs were normal. Needle EMG was deferred (reason: not necessary to answer referral question).    Interpretation:    Abnormal study. There is electrodiagnostic evidence of bilateral median neuropathies at the wrist, as seen in carpal tunnel syndrome, moderate on the right, and mild on the left.    EMG Physician:    Bud Wade MD       Sensory NCS      Nerve / Sites Rec. Site Onset Peak Ref. NP Amp Ref. PP Amp Dist Chip Ref. Temp     ms ms ms  V  V  V cm m/s m/s  C   R MEDIAN - Dig II Anti      Wrist Dig II 3.33 4.48  25.0 10.0 30.6 14 42.0 48.0 32.5   L MEDIAN - Dig II Anti      Wrist Dig II 3.23 4.11  28.2 10.0 48.6 14 43.4 48.0 32.3   R ULNAR - Dig V Anti      Wrist Dig V 2.40 3.44  25.3 8.0 41.0 12.5 52.2 48.0 32.5   L ULNAR - Dig V Anti      Wrist Dig V 2.50 3.44  28.2 8.0 41.3 12.5 50.0 48.0 32.5   R MEDIAN - Ulnar - Palmar       Median Wrist 2.14 2.76 2.40 28.3  35.7 8 37.5  32.8      Ulnar Wrist 1.56 2.14 2.40 18.2  26.6 8 51.2  32.8   L MEDIAN - Ulnar - Palmar      Median Wrist 1.93 2.50 2.40 18.9  55.7 8 41.5  31.5      Ulnar Wrist 1.46 2.03 2.40 26.3  23.9 8 54.9  31.3       Motor NCS      Nerve / Sites Rec. Site Lat Ref. Amp Ref. Rel Amp Dist Chip Ref. Dur. Area Temp.     ms ms mV mV % cm m/s m/s ms %  C   R MEDIAN - APB      Wrist APB 5.10 4.40 15.5 5.0 100 8   6.51 100 32.7      Elbow APB 9.22  14.9  95.8 24 58.3 48.0 6.77 92.2 32.7   L MEDIAN - APB      Wrist APB 3.96 4.40 17.0 5.0 100 8   5.78 100 32.6      Elbow APB 7.92  17.4  102 23.5 59.4 48.0 6.09 103 33   R ULNAR - ADM      Wrist ADM 3.13 3.50 14.7 5.0 100 8   6.51 100 32.4      B.Elbow ADM 6.67  14.4  97.9 20 56.5 48.0 6.61 95.7 32.4      A.Elbow ADM 8.75  14.0  95.6 11 52.8 48.0 6.82 93.5 32.5   L ULNAR - ADM      Wrist ADM 3.13 3.50 10.8 5.0 100 8   6.93 100 31.6      B.Elbow ADM 6.25  10.5  98 19 60.8 48.0 7.24 97.4 31.6      A.Elbow ADM 8.28  10.8  100 11 54.2 48.0 7.24 96.3 31.6

## 2018-10-11 NOTE — PROGRESS NOTES
Service Date: 10/11/2018      Kwabena Rain MD (Kion)   [facility]    [address] [place] [address]      RE: Derrick Andrade   MRN: [id number]   : 1970      Dear Dr. Rain:      I had the pleasure to see Mr. Andrade at the [facility] in followup.  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 Genetic testing for several genes associated with this disease as well as the periodic paralysis genes was unremarkable as was a long exercise test.  His chief complaint is severe widespread myalgia and stiffness since age 16, with muscular examination being entirely normal on several occasions I have seen him.  Unfortunately, his pain is very refractory to treatment.  I attempted to treat him with dantrolene and prednisone and he did not tolerate any of the two due to side effects.  He has been on numerous pain medications the previous years including amitriptyline, gabapentin, etc., with limited to no relief, and he has seen multiple pain clinics with generally poor results including a visit to the comprehensive [facility] program last year. There is no specific treatment options available for his condition.      He has 3 complaints today.  The one is worsening muscle stiffness and pain and feeling of weakness which has been gradually deteriorating since the problem began around age 16-18. Second issue is visual impairment, described by the patient as blurry vision when looking far, but not near. He rarely gets double vision, which he can overcome by blinking, so this does not appear to be neurologic diplopia. He has seen three eye doctors or optometrists, has changed his lens prescription repeatedly, and tried dry eyes with drops but none of those have provided relief of the symptom which is frustrating for him.  He does not describe distortion of visual perception of shapes or colors.  There are no visual  hallucinations or more complex abnormal perceptions.  He cannot identify the problem as occurring on the right or left visual field and there is definitely no scotoma, to my understanding.  This has been going on for the past year.  He does not have any history of recent eye surgery, trauma or discharge.      Third issue is bilateral hand numbness, that sometimes occurs at the forearm, but it is mostly the fingers.  It occurs when he wakes up in the morning, at night or when he drives.  Repetitive hand motion seems to be playing a role.      CURRENT MEDICATIONS:  Reviewed and are as per Epic record.      PHYSICAL EXAMINATION:   VITAL SIGNS:  His blood pressure is 120/76.  Pulse 52 and regular.  O2 sat 97% on room air.  Weight 80.3 kilos.  Height is 172.  He endorses moderate pain, 5/10, in both legs and arms.   NEUROLOGIC:  He has 5/5 strength for neck flexion, extension, trapezius, bilateral deltoids, biceps, triceps, wrist extensors, finger extensors, FDI, APB, hand , hip flexion, knee extension, knee flexion, foot dorsiflexion. He has no hand  myotonia.  He has a positive Phalen sign at the wrist but negative Tinel.  Sensory exam of the digits is normal.    As for cranial nerves, his visual fields are full to confrontation bilaterally.  Pupils are about 4-5 mm, both briskly reactive to light and accommodation.  Extraocular muscle testing shows completely intact ductions and versions.  I cannot appreciate diplopia in any cardinal gaze position.  He does not have any lid levator or extraocular muscle fatigue on sustained testing.  There is no weakness of orbicularis oculi.  Funduscopic exam was grossly normal with intact sharp disks.  I did not test his visual acuity.  Saccades and smooth pursuit movements were normal.  Facial sensation was intact in all distributions of trigeminal nerve.  Cranial nerve VII was normal with intact smile, orbicularis oris and oculi function.  Tongue was protruding at midline  without atrophy or fasciculations.  Uvula and palate were at midline.  Voice and speech were normal.       IMPRESSION:    1. Visual disturbance at far with no identifiable neurologic cause by exam.  2. Bilateral carpal tunnel syndrome.  3. Tubular aggregate myopathy.    I spent about 25 minutes with Mr Andrade of which more than half was counseling. I am unable to identify any neurologic cause for his visual impairment. It is admittedly frustrating that he has seen 3 eye providers without an answer, but the only thing I can do in this case is to refer him to one of our Neuro-ophthalmologists for another opinion.  I am not going to order any additional tests unless the neuro-ophthalmologists are unable to identify the cause; in that case I would do a brain MRI.     The intermittent hand numbness is due to carpal tunnel syndrome, mild on the left, and moderate on the right, which we confirmed by EMG/NCS done today in Clinic. I would recommend wrist splinting bilaterally. If symptoms fail to improve after 1-2 months, please refer him to a local hand surgeon. 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 This has nothing to do with the tubular aggregate myopathy.     The increasing myalgia and stiffness is due to #3. I regret to tell you that I have nothing to offer him at this point.  I reassured him that this will not behave like a muscular dystrophy. He continues with intact strength on examination after more than 30 years of this disease, and I believe he will have intact muscular strength through his life, but he is in the unfortunate situation of having to deal with difficult to control chronic pain syndrome without specific treatment options available anymore (many were previously tried, as outlined above, but either not tolerated or failed). A Pain Clinic would be probably much more helpful to deal with this problem than a  Neuromuscular Clinic.     Thank you for allowing me to participate in Mr Andrade's care.      Sincerely,       MD KAYLEE Arreaga MD             D: 10/11/2018   T: 10/11/2018   MT: AKA      Name:     CYNDEE ANDRADE   MRN:      [id number]        Account:      [id number]   :      1970           Service Date: 10/11/2018      Document: [id number]      Answers for HPI/ROS submitted by the patient on 10/11/2018   General Symptoms: No  Skin Symptoms: No  HENT Symptoms: Yes  EYE SYMPTOMS: Yes  HEART SYMPTOMS: Yes  LUNG SYMPTOMS: No  INTESTINAL SYMPTOMS: Yes  URINARY SYMPTOMS: Yes  REPRODUCTIVE SYMPTOMS: No  SKELETAL SYMPTOMS: Yes  BLOOD SYMPTOMS: No  NERVOUS SYSTEM SYMPTOMS: Yes  MENTAL HEALTH SYMPTOMS: No  Ear pain: No  Ear discharge: No  Hearing loss: Yes  Tinnitus: Yes  Nosebleeds: No  Congestion: No  Sinus pain: No   Voice hoarseness: No  Mouth sores: No  Sore throat: No  Tooth pain: No  Gum tenderness: No  Bleeding gums: Yes  Change in taste: No  Change in sense of smell: No  Dry mouth: No  Hearing aid used: No  Neck lump: No  Eye pain: Yes  Vision loss: Yes  Dry eyes: No  Watery eyes: No  Eye bulging: No  Double vision: Yes  Flashing of lights: No  Spots: No  Floaters: Yes  Redness: No  Crossed eyes: No  Tunnel Vision: No  Yellowing of eyes: No  Eye irritation: No  Chest pain or pressure: Yes  Fast or irregular heartbeat: No  Pain in legs with walking: Yes  High blood pressure: No  Low blood pressure: No  Fainting: No  Murmurs: No  Pacemaker: No  Varicose veins: No  Edema or swelling: No  Wake up at night with shortness of breath: No  Light-headedness: Yes  Exercise intolerance: No  Heart burn or indigestion: No  Nausea: No  Vomiting: No  Abdominal pain: No  Bloating: No  Constipation: Yes  Diarrhea: No  Blood in stool: No  Black stools: No  Rectal or Anal pain: No  Fecal incontinence: No  Yellowing of skin or eyes: No  Vomit with blood: No  Change in stools: No  Trouble  holding urine or incontinence: No  Pain or burning: No  Trouble starting or stopping: Yes  Increased frequency of urination: No  Blood in urine: No  Decreased frequency of urination: No  Frequent nighttime urination: No  Flank pain: No  Difficulty emptying bladder: Yes  Back pain: Yes  Muscle aches: Yes  Neck pain: Yes  Swollen joints: No  Joint pain: Yes  Bone pain: Yes  Muscle cramps: Yes  Muscle weakness: Yes  Joint stiffness: Yes  Bone fracture: No  Trouble with coordination: No  Dizziness or trouble with balance: No  Fainting or black-out spells: No  Memory loss: Yes  Headache: Yes  Seizures: No  Speech problems: No  Tingling: Yes  Tremor: No  Weakness: Yes  Difficulty walking: Yes  Paralysis: No  Numbness: Yes  PHQ-2 Score: 0

## 2018-10-11 NOTE — MR AVS SNAPSHOT
After Visit Summary   10/11/2018    Derrick Andrade    MRN: [id number]           Patient Information     Date Of Birth          1970        Visit Information        Provider Department      10/11/2018 11:00 AM Bud Wade MD [facility] Neurology        Today's Diagnoses     Bilateral hand numbness    -  1    Blurred vision           Follow-ups after your visit        Additional Services     OPHTHALMOLOGY ADULT REFERRAL       Your provider has referred you to: [facility]: [facility] - [place] [phone]   [email or web address]  Jarrett Glasgow or Kartik please.    Blurry vision at far. Has seen 3 eye providers- not happy- tried correcting glasses, treating dry eyes, no improvement. Neurological eye exam completely normal- no cranial nerve deficit, normal ductions, versions, saccades, fundus exam, pupils, and bedside visual fields. No diplopia.    Please be aware that coverage of these services is subject to the terms and limitations of your health insurance plan.  Call member services at your health plan with any benefit or coverage questions.      Please bring the following with you to your appointment:    (1) Any X-Rays, CTs or MRIs which have been performed.  Contact the facility where they were done to arrange for  prior to your scheduled appointment.    (2) List of current medications  (3) This referral request   (4) Any documents/labs given to you for this referral                  Your next 10 appointments already scheduled     Oct 11, 2018  1:00 PM CDT   (Arrive by 12:45 PM)   EMG with Bud Wade MD   [facility] ([facility])    [address] [place] [id number] [phone]           Do not use lotions or creams on the area to be tested. If you are on blood thinners (Warfarin, Coumadin, Lovenox, etc), please contact your primary care physician to check if it is safe to stop  "them 3 days prior to testing. If you have anxiety, please check with your referring physician to obtain anti-anxiety medication for the procedure.            Oct 22, 2018  1:30 PM CDT   NEW NEURO with Jose Angel Glasgow MD   Eye Clinic ([facility])    [address]  [address] [address]  [facility] [address]   [phone]              Future tests that were ordered for you today     Open Future Orders        Priority Expected Expires Ordered    EMG Routine  10/11/2019 10/11/2018            Who to contact     Please call your clinic at [phone] to:    Ask questions about your health    Make or cancel appointments    Discuss your medicines    Learn about your test results    Speak to your doctor            Additional Information About Your Visit        In[employer] Information     [employer] gives you secure access to your electronic health record. If you see a primary care provider, you can also send messages to your care team and make appointments. If you have questions, please call your primary care clinic.  If you do not have a primary care provider, please call [phone] and they will assist you.      In[employer] is an electronic gateway that provides easy, online access to your medical records. With [employer], you can request a clinic appointment, read your test results, renew a prescription or communicate with your care team.     To access your existing account, please contact your [facility] or call [phone] for assistance.        Care EveryWhere ID     This is your Care EveryWhere ID. This could be used by other organizations to access your [place] medical records  [id number]        Your Vitals Were     Pulse Temperature Height Pulse Oximetry BMI (Body Mass Index)       52 97.7  F (36.5  C) 1.727 m (5' 8\") 97% 26.91 kg/m2        Blood Pressure from Last 3 Encounters:   10/11/18 120/76   02/27/17 131/80   01/30/17 124/77    Weight from " Last 3 Encounters:   10/11/18 80.3 kg (177 lb)   02/27/17 80.9 kg (178 lb 6.4 oz)   01/30/17 83 kg (183 lb)              We Performed the Following     OPHTHALMOLOGY ADULT REFERRAL       Information about OPIOIDS     PRESCRIPTION OPIOIDS: WHAT YOU NEED TO KNOW   We gave you an opioid (narcotic) pain medicine. It is important to manage your pain, but opioids are not always the best choice. You should first try all the other options your care team gave you. Take this medicine for as short a time (and as few doses) as possible.    Some activities can increase your pain, such as bandage changes or therapy sessions. It may help to take your pain medicine 30 to 60 minutes before these activities. Reduce your stress by getting enough sleep, working on hobbies you enjoy and practicing relaxation or meditation. Talk to your care team about ways to manage your pain beyond prescription opioids.    These medicines have risks:    DO NOT drive when on new or higher doses of pain medicine. These medicines can affect your alertness and reaction times, and you could be arrested for driving under the influence (DUI). If you need to use opioids long-term, talk to your care team about driving.    DO NOT operate heavy machinery    DO NOT do any other dangerous activities while taking these medicines.    DO NOT drink any alcohol while taking these medicines.     If the opioid prescribed includes acetaminophen, DO NOT take with any other medicines that contain acetaminophen. Read all labels carefully. Look for the word  acetaminophen  or  Tylenol.  Ask your pharmacist if you have questions or are unsure.    You can get addicted to pain medicines, especially if you have a history of addiction (chemical, alcohol or substance dependence). Talk to your care team about ways to reduce this risk.    All opioids tend to cause constipation. Drink plenty of water and eat foods that have a lot of fiber, such as fruits, vegetables, prune juice, apple  juice and high-fiber cereal. Take a laxative (Miralax, milk of magnesia, Colace, Senna) if you don t move your bowels at least every other day. Other side effects include upset stomach, sleepiness, dizziness, throwing up, tolerance (needing more of the medicine to have the same effect), physical dependence and slowed breathing.    Store your pills in a secure place, locked if possible. We will not replace any lost or stolen medicine. If you don t finish your medicine, please throw away (dispose) as directed by your pharmacist. The [place] [employer]ution [employer] has more information about safe disposal: [email or web address]         Primary Care Provider Office Phone # Fax #    Kwabena Rain [phone]       [place] [address]        Equal Access to Services     JABIER BRAVO : Hadii claudia fernandezo Sorhett, waaxda luqsuzette, qaybta kaalmada anuja, raian sanders . So [facility] [phone].    ATENCIÓN: Si habla español, tiene a mosqueda disposición servicios gratuitos de asistencia lingüística. Jordiame al [phone].    We comply with applicable federal civil rights laws and [place] laws. We do not discriminate on the basis of race, color, national origin, age, disability, sex, sexual orientation, or gender identity.            Thank you!     Thank you for choosing [facility] NEUROLOGY  for your care. Our goal is always to provide you with excellent care. Hearing back from our patients is one way we can continue to improve our services. Please take a few minutes to complete the written survey that you may receive in the mail after your visit with us. Thank you!             Your Updated Medication List - Protect others around you: Learn how to safely use, store and throw away your medicines at www.disposemymeds.org.          This list is accurate as of 10/11/18 12:46 PM.   Always use your most recent med list.                   Brand Name Dispense Instructions for use Diagnosis    atenolol 25 MG tablet    TENORMIN     Take 1 tablet by mouth once        calcium carbonate-vitamin D 600-200 MG-UNIT Caps      Take 1 tablet by mouth daily as needed        dantrolene 25 MG capsule    DANTRIUM    90 capsule    Take 1 capsule daily for 1 week, then  1 capsule three times a day for 1 week, then 2 capsules three times a day    Muscle stiffness       * ibuprofen 800 MG tablet    ADVIL/MOTRIN     Take 1 tablet by mouth 3 times daily (before meals)        * ibuprofen 800 MG tablet    ADVIL/MOTRIN     Take 1 tablet by mouth 3 times daily (before meals)        levothyroxine 150 MCG tablet    SYNTHROID/LEVOTHROID     TK 1 T PO D.        LORazepam 1 MG tablet    ATIVAN    1 tablet    Take 1 tablet (1 mg) by mouth once as needed for anxiety Take 30 minutes prior to departure.  Do not operate a vehicle after taking this medication    Needle phobia       omeprazole 20 MG CR capsule    priLOSEC    30 capsule    Take 1 capsule (20 mg) by mouth daily    Gastroesophageal reflux disease without esophagitis       oxyCODONE IR 5 MG tablet    ROXICODONE     Take 5 mg by mouth as needed        predniSONE 10 MG tablet    DELTASONE    21 tablet    Take 20mg daily for 1 week, then 10mg for 1 week, then stop.    Tubular aggregate myopathy       simvastatin 10 MG tablet    ZOCOR     Take 1 tablet by mouth once        * testosterone cypionate 200 MG/ML injection    DEPOTESTOSTERONE     Inject 200 mg into the muscle every 21 days        * testosterone cypionate 200 MG/ML injection    DEPOTESTOSTERONE     Inject 200 mg into the muscle        * traMADol 50 MG tablet    ULTRAM     Take 50 mg by mouth 6 times daily        * traMADol 50 MG tablet    ULTRAM     Take 100 mg by mouth        zolpidem 10 MG tablet    AMBIEN     Take 10 mg by mouth once        * Notice:  This list has 6 medication(s) that are the same as other  medications prescribed for you. Read the directions carefully, and ask your doctor or other care provider to review them with you.

## 2018-10-11 NOTE — LETTER
10/11/2018       RE: Derrick Andrade  [address]  [facility] [address]     Dear Colleague,    Thank you for referring your patient, Derrick Andrade, to the [facility] NEUROLOGY at [facility]. Please see a copy of my visit note below.    Service Date: 10/11/2018      Kwabena Rain MD ([place])   [facility]    [address] [place] [address]      RE: Derrick Andrade   MRN: [id number]   : 1970      Dear Dr. Rain:      I had the pleasure to see Mr. Andrade at the [facility] in followup.  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 Genetic testing for several genes associated with this disease as well as the periodic paralysis genes was unremarkable as was a long exercise test.  His chief complaint is severe widespread myalgia and stiffness since age 16, with muscular examination being entirely normal on several occasions I have seen him.  Unfortunately, his pain is very refractory to treatment.  I attempted to treat him with dantrolene and prednisone and he did not tolerate any of the two due to side effects.  He has been on numerous pain medications the previous years including amitriptyline, gabapentin, etc., with limited to no relief, and he has seen multiple pain clinics with generally poor results including a visit to the comprehensive [facility] program last year. There is no specific treatment options available for his condition.      He has 3 complaints today.  The one is worsening muscle stiffness and pain and feeling of weakness which has been gradually deteriorating since the problem began around age 16-18. Second issue is visual impairment, described by the patient as blurry vision when looking far, but not near. He rarely gets double vision, which he can overcome by blinking, so this does not appear to be neurologic diplopia. He has seen three eye doctors or  optometrists, has changed his lens prescription repeatedly, and tried dry eyes with drops but none of those have provided relief of the symptom which is frustrating for him.  He does not describe distortion of visual perception of shapes or colors.  There are no visual hallucinations or more complex abnormal perceptions.  He cannot identify the problem as occurring on the right or left visual field and there is definitely no scotoma, to my understanding.  This has been going on for the past year.  He does not have any history of recent eye surgery, trauma or discharge.      Third issue is bilateral hand numbness, that sometimes occurs at the forearm, but it is mostly the fingers.  It occurs when he wakes up in the morning, at night or when he drives.  Repetitive hand motion seems to be playing a role.      CURRENT MEDICATIONS:  Reviewed and are as per Epic record.      PHYSICAL EXAMINATION:   VITAL SIGNS:  His blood pressure is 120/76.  Pulse 52 and regular.  O2 sat 97% on room air.  Weight 80.3 kilos.  Height is 172.  He endorses moderate pain, 5/10, in both legs and arms.   NEUROLOGIC:  He has 5/5 strength for neck flexion, extension, trapezius, bilateral deltoids, biceps, triceps, wrist extensors, finger extensors, FDI, APB, hand , hip flexion, knee extension, knee flexion, foot dorsiflexion. He has no hand  myotonia.  He has a positive Phalen sign at the wrist but negative Tinel.  Sensory exam of the digits is normal.    As for cranial nerves, his visual fields are full to confrontation bilaterally.  Pupils are about 4-5 mm, both briskly reactive to light and accommodation.  Extraocular muscle testing shows completely intact ductions and versions.  I cannot appreciate diplopia in any cardinal gaze position.  He does not have any lid levator or extraocular muscle fatigue on sustained testing.  There is no weakness of orbicularis oculi.  Funduscopic exam was grossly normal with intact sharp disks.  I did  not test his visual acuity.  Saccades and smooth pursuit movements were normal.  Facial sensation was intact in all distributions of trigeminal nerve.  Cranial nerve VII was normal with intact smile, orbicularis oris and oculi function.  Tongue was protruding at midline without atrophy or fasciculations.  Uvula and palate were at midline.  Voice and speech were normal.       IMPRESSION:    1. Visual disturbance at far with no identifiable neurologic cause by exam.  2. Bilateral carpal tunnel syndrome.  3. Tubular aggregate myopathy.    I spent about 25 minutes with Mr Andrade of which more than half was counseling. I am unable to identify any neurologic cause for his visual impairment. It is admittedly frustrating that he has seen 3 eye providers without an answer, but the only thing I can do in this case is to refer him to one of our Neuro-ophthalmologists for another opinion.  I am not going to order any additional tests unless the neuro-ophthalmologists are unable to identify the cause; in that case I would do a brain MRI.     The intermittent hand numbness is due to carpal tunnel syndrome, mild on the left, and moderate on the right, which we confirmed by EMG/NCS done today in Clinic. I would recommend wrist splinting bilaterally. If symptoms fail to improve after 1-2 months, please refer him to a local hand surgeon. 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 This has nothing to do with the tubular aggregate myopathy.     The increasing myalgia and stiffness is due to #3. I regret to tell you that I have nothing to offer him at this point.  I reassured him that this will not behave like a muscular dystrophy. He continues with intact strength on examination after more than 30 years of this disease, and I believe he will have intact muscular strength through his life, but he is in the unfortunate situation of having to  deal with difficult to control chronic pain syndrome without specific treatment options available anymore (many were previously tried, as outlined above, but either not tolerated or failed). A Pain Clinic would be probably much more helpful to deal with this problem than a Neuromuscular Clinic.     Thank you for allowing me to participate in Mr Andrade's care.      Sincerely,       Bud Wade MD

## 2018-10-22 ENCOUNTER — OFFICE VISIT (OUTPATIENT)
Dept: OPHTHALMOLOGY | Facility: CLINIC | Age: 48
End: 2018-10-22
Attending: OPHTHALMOLOGY
Payer: MEDICARE

## 2018-10-22 DIAGNOSIS — H53.10 SUBJECTIVE VISUAL DISTURBANCE: Primary | ICD-10-CM

## 2018-10-22 DIAGNOSIS — H53.40 VISUAL FIELD DEFECT: ICD-10-CM

## 2018-10-22 DIAGNOSIS — H53.8 BLURRED VISION: ICD-10-CM

## 2018-10-22 DIAGNOSIS — H53.10 SUBJECTIVE VISUAL DISTURBANCE: ICD-10-CM

## 2018-10-22 DIAGNOSIS — H04.123 DRY EYE SYNDROME, BILATERAL: Primary | ICD-10-CM

## 2018-10-22 PROCEDURE — 92133 CPTRZD OPH DX IMG PST SGM ON: CPT | Mod: ZF | Performed by: OPHTHALMOLOGY

## 2018-10-22 PROCEDURE — 92083 EXTENDED VISUAL FIELD XM: CPT | Mod: ZF | Performed by: OPHTHALMOLOGY

## 2018-10-22 PROCEDURE — G0463 HOSPITAL OUTPT CLINIC VISIT: HCPCS | Mod: ZF | Performed by: TECHNICIAN/TECHNOLOGIST

## 2018-10-22 ASSESSMENT — VISUAL ACUITY
OS_CC+: -3
OS_CC: 20/25
METHOD: SNELLEN - LINEAR
OS_PH_CC: 20/20-2
OD_PH_CC: 20/20-1
CORRECTION_TYPE: GLASSES
OD_CC: 20/25

## 2018-10-22 ASSESSMENT — REFRACTION_WEARINGRX
OD_CYLINDER: +0.50
OS_AXIS: 040
OS_SPHERE: -5.75
OD_AXIS: 110
OD_SPHERE: -5.75
OD_ADD: +2.25
OS_CYLINDER: +0.50
SPECS_TYPE: PAL
OS_ADD: +2.25

## 2018-10-22 ASSESSMENT — TONOMETRY
OS_IOP_MMHG: 18
OD_IOP_MMHG: 17
IOP_METHOD: ICARE

## 2018-10-22 ASSESSMENT — SLIT LAMP EXAM - LIDS
COMMENTS: MGD, BLEPHARITIS
COMMENTS: MGD, BLEPHARITIS

## 2018-10-22 ASSESSMENT — EXTERNAL EXAM - RIGHT EYE: OD_EXAM: NORMAL

## 2018-10-22 ASSESSMENT — CUP TO DISC RATIO
OS_RATIO: 0.3
OD_RATIO: 0.4

## 2018-10-22 ASSESSMENT — CONF VISUAL FIELD
OD_NORMAL: 1
OS_NORMAL: 1
METHOD: COUNTING FINGERS

## 2018-10-22 ASSESSMENT — EXTERNAL EXAM - LEFT EYE: OS_EXAM: NORMAL

## 2018-10-22 NOTE — NURSING NOTE
Chief Complaints and History of Present Illnesses   Patient presents with     New Patient     r/o subjective visual disturbance     HPI    Symptoms:              Comments:  Patient states blurry vision in both eyes. Patient started to notice blurry vision since April 2018, he thought it was his glasses so went to [facility] for a new glasses prescription in March 2018 however after 2 glasses remade, he is still c/o seeing blurry in both eyes.   +headaches, eye strain, especially on cloudy days.     Patient states he was told he had cataracts in both eyes but just starting.   +claustrophobic per patient.     BABAR Cruz 10/22/2018 12:57 PM

## 2018-10-22 NOTE — MR AVS SNAPSHOT
After Visit Summary   10/22/2018    Derrick Andrade    MRN: [id number]           Patient Information     Date Of Birth          1970        Visit Information        Provider Department      10/22/2018 1:30 PM Jose Angel Glasgow MD [facility]        Today's Diagnoses     Dry eye syndrome, bilateral    -  1    Blurred vision        Subjective visual disturbance        Visual field defect           Follow-ups after your visit        Future tests that were ordered for you today     Open Future Orders        Priority Expected Expires Ordered    OCT Optic Nerve RNFL Spectralis OU (both eyes) Routine  12/21/2018 10/22/2018            Who to contact     Please call your clinic at [phone] to:    Ask questions about your health    Make or cancel appointments    Discuss your medicines    Learn about your test results    Speak to your doctor            Additional Information About Your Visit        [employer] Information     [employer] gives you secure access to your electronic health record. If you see a primary care provider, you can also send messages to your care team and make appointments. If you have questions, please call your primary care clinic.  If you do not have a primary care provider, please call [phone] and they will assist you.      [employer] is an electronic gateway that provides easy, online access to your medical records. With [employer], you can request a clinic appointment, read your test results, renew a prescription or communicate with your care team.     To access your existing account, please contact your [facility] or call [phone] for assistance.        Care EveryWhere ID     This is your Care EveryWhere ID. This could be used by other organizations to access your [place] medical records  [id number]         Blood Pressure from Last 3 Encounters:   10/11/18 120/76   02/27/17 131/80   01/30/17 124/77    Weight from Last 3 Encounters:   10/11/18  80.3 kg (177 lb)   02/27/17 80.9 kg (178 lb 6.4 oz)   01/30/17 83 kg (183 lb)              We Performed the Following     Glaucoma Top OU     IOP Measurement     OCT Optic Nerve RNFL Spectralis OU (both eyes)        Primary Care Provider Office Phone # Fax #    Kwabena Rain [phone]       [facility] [address] [place] [address]        Equal Access to Services     JABIER BRAVO : Hadii aad ku hadasho Soomaali, waaxda luqadaha, qaybta kaalmada adeegyada, waxay idiin hayaan blaze scherer. So [facility] [phone].    ATENCIÓN: Si olgala naveen, tiene a mosqueda disposición servicios gratuitos de asistencia lingüística. [facility] [phone].    We comply with applicable federal civil rights laws and [place] laws. We do not discriminate on the basis of race, color, national origin, age, disability, sex, sexual orientation, or gender identity.            Thank you!     Thank you for choosing EYE CLINIC  for your care. Our goal is always to provide you with excellent care. Hearing back from our patients is one way we can continue to improve our services. Please take a few minutes to complete the written survey that you may receive in the mail after your visit with us. Thank you!             Your Updated Medication List - Protect others around you: Learn how to safely use, store and throw away your medicines at www.disposemymeds.org.          This list is accurate as of 10/22/18  4:05 PM.  Always use your most recent med list.                   Brand Name Dispense Instructions for use Diagnosis    atenolol 25 MG tablet    TENORMIN     Take 1 tablet by mouth once        calcium carbonate-vitamin D 600-200 MG-UNIT Caps      Take 1 tablet by mouth daily as needed        dantrolene 25 MG capsule    DANTRIUM    90 capsule    Take 1 capsule daily for 1 week, then  1 capsule three times a day for 1 week, then 2 capsules three times a day    Muscle stiffness       * ibuprofen  800 MG tablet    ADVIL/MOTRIN     Take 1 tablet by mouth 3 times daily (before meals)        * ibuprofen 800 MG tablet    ADVIL/MOTRIN     Take 1 tablet by mouth 3 times daily (before meals)        levothyroxine 150 MCG tablet    SYNTHROID/LEVOTHROID     TK 1 T PO D.        LORazepam 1 MG tablet    ATIVAN    1 tablet    Take 1 tablet (1 mg) by mouth once as needed for anxiety Take 30 minutes prior to departure.  Do not operate a vehicle after taking this medication    Needle phobia       omeprazole 20 MG CR capsule    priLOSEC    30 capsule    Take 1 capsule (20 mg) by mouth daily    Gastroesophageal reflux disease without esophagitis       oxyCODONE IR 5 MG tablet    ROXICODONE     Take 5 mg by mouth as needed        predniSONE 10 MG tablet    DELTASONE    21 tablet    Take 20mg daily for 1 week, then 10mg for 1 week, then stop.    Tubular aggregate myopathy       simvastatin 10 MG tablet    ZOCOR     Take 1 tablet by mouth once        * testosterone cypionate 200 MG/ML injection    DEPOTESTOSTERONE     Inject 200 mg into the muscle every 21 days        * testosterone cypionate 200 MG/ML injection    DEPOTESTOSTERONE     Inject 200 mg into the muscle        * traMADol 50 MG tablet    ULTRAM     Take 50 mg by mouth 6 times daily        * traMADol 50 MG tablet    ULTRAM     Take 100 mg by mouth        zolpidem 10 MG tablet    AMBIEN     Take 10 mg by mouth once        * Notice:  This list has 6 medication(s) that are the same as other medications prescribed for you. Read the directions carefully, and ask your doctor or other care provider to review them with you.

## 2018-10-22 NOTE — PROGRESS NOTES
Assessment & Plan     Derrick Andrade is a 48 year old male with the following diagnoses:   1. Blurred vision    2. Subjective visual disturbance       Patient is a 49 y/o M with PMH including tubular aggregate myopathy followed by Dr. Wade.  Referred by Dr. Wade for concern of related eye findings and blurry vision. POH includes myopia. Has been seen by Dr. Rodney, Khalif, Dr. Roberts of [place]. He is uncertain of when his blurry vision started. He got new glasses in March which did not improve his vision. Has been told he has cataracts and has been treated for dry eye with Maxitrol which did not improve his symptoms. He notes that his blurry vision is worse on cloudy days. Vision worse when driving and when watching TV. Occasional diplopia with reading that has been present for many years. He is able to focus and have the diplopia resolve. Glare with night driving. Denies itching/burning sensation in eyes. Last treated with prednisone over a year ago.     Family history negative for AMD, glaucoma.      Visual acuity is 20/25 in both eyes. IOP normal in both eyes. No APD. Confrontation visual fields full. Color plates full. Extraocular motility normal. Visual fields normal. OCT rNFL normal, baseline. OCT macula including segmentation normal. Topography shows mild irregular astigmatism. Slit lamp examination shows decreased tear break up time in both eyes though left greater than right, and trace nuclear sclerotic cataract in both eyes. Dilated fundus examination shows PPA around both optic nerves and slight tilting of the left consistent with known myopia.    In summary, his visual acuity is just under 20/20.  His visual field is relatively normal and his optical coherence tomography is normal.  I do not find anything amiss except for very mild cataracts, which would be consistent with his mild visual acuity changes. I do not believe that the tubular aggregate myopathy is causing any  changes in his vision. Would recommend Cataract extraction.  He will see Dr. Roberts about this.  Return to clinic as needed.              Attending Physician Attestation:  Complete documentation of historical and exam elements from today's encounter can be found in the full encounter summary report (not reduplicated in this progress note).  I personally obtained the chief complaint(s) and history of present illness.  I confirmed and edited as necessary the review of systems, past medical/surgical history, family history, social history, and examination findings as documented by others; and I examined the patient myself.  I personally reviewed the relevant tests, images, and reports as documented above.  I formulated and edited as necessary the assessment and plan and discussed the findings and management plan with the patient and family. - Jose Angel Romero MD  Ophthalmology, PGY-4

## 2018-10-22 NOTE — LETTER
10/22/2018         RE:  :  MRN: Derrick Andrade  1970  [id number]     Dear Dr. Wade,    Thank you for asking me to see your very pleasant patient, Derrick Andrade, in neuro-ophthalmic consultation.  I would like to thank you for sending your records and I have summarized them in the history of present illness. He presented with his spouse who provided additional history.  My assessment and plan are below.  For further details, please see my attached clinic note.          Assessment & Plan     Derrick Andrade is a 48 year old male with the following diagnoses:   1. Blurred vision    2. Subjective visual disturbance       Patient is a 49 y/o M with PMH including tubular aggregate myopathy followed by Dr. Wade.  Referred by Dr. Wade for concern of related eye findings and blurry vision. POH includes myopia. Has been seen by Dr. Rodney, Khalif, Dr. Roberts of [place]. He is uncertain of when his blurry vision started. He got new glasses in March which did not improve his vision. Has been told he has cataracts and has been treated for dry eye with Maxitrol which did not improve his symptoms. He notes that his blurry vision is worse on cloudy days. Vision worse when driving and when watching TV. Occasional diplopia with reading that has been present for many years. He is able to focus and have the diplopia resolve. Glare with night driving. Denies itching/burning sensation in eyes. Last treated with prednisone over a year ago.     Family history negative for AMD, glaucoma.      Visual acuity is 20/25 in both eyes. IOP normal in both eyes. No APD. Confrontation visual fields full. Color plates full. Extraocular motility normal. Visual fields normal. OCT rNFL normal, baseline. OCT macula including segmentation normal. Topography shows mild irregular astigmatism. Slit lamp examination shows decreased tear break up time in both eyes though left greater than right, and trace nuclear sclerotic  cataract in both eyes. Dilated fundus examination shows PPA around both optic nerves and slight tilting of the left consistent with known myopia.    In summary, his visual acuity is just under 20/20.  His visual field is relatively normal and his optical coherence tomography is normal.  I do not find anything amiss except for very mild cataracts, which would be consistent with his mild visual acuity changes. I do not believe that the tubular aggregate myopathy is causing any changes in his vision. Would recommend Cataract extraction.  He will see Dr. Roberts about this.  Return to clinic as needed.         Again, thank you for allowing me to participate in the care of your patient.      Sincerely,    Jose Angel Glasgow MD  Professor, Neuro-Ophthalmology  Department of Ophthalmology and Visual Neurosciences  [facility]    CC: Bud Wade MD  [address] [place], [place] [address] [address]  VIA In Basket     Kwabena Rain MD  [facility]  [address] [facility] [address]  VIA Facsimile: [phone]     [facility] [address]  VIA Facsimile:  [phone]

## 2019-11-07 ENCOUNTER — HEALTH MAINTENANCE LETTER (OUTPATIENT)
Age: 49
End: 2019-11-07

## 2020-02-17 ENCOUNTER — HEALTH MAINTENANCE LETTER (OUTPATIENT)
Age: 50
End: 2020-02-17

## 2020-11-29 ENCOUNTER — HEALTH MAINTENANCE LETTER (OUTPATIENT)
Age: 50
End: 2020-11-29

## 2021-04-10 ENCOUNTER — HEALTH MAINTENANCE LETTER (OUTPATIENT)
Age: 51
End: 2021-04-10

## 2021-09-25 ENCOUNTER — HEALTH MAINTENANCE LETTER (OUTPATIENT)
Age: 51
End: 2021-09-25

## 2022-01-17 ENCOUNTER — MEDICAL CORRESPONDENCE (OUTPATIENT)
Dept: HEALTH INFORMATION MANAGEMENT | Facility: CLINIC | Age: 52
End: 2022-01-17
Payer: COMMERCIAL

## 2022-05-07 ENCOUNTER — HEALTH MAINTENANCE LETTER (OUTPATIENT)
Age: 52
End: 2022-05-07

## 2022-06-02 ENCOUNTER — OFFICE VISIT (OUTPATIENT)
Dept: NEUROLOGY | Facility: CLINIC | Age: 52
End: 2022-06-02
Payer: COMMERCIAL

## 2022-06-02 VITALS
DIASTOLIC BLOOD PRESSURE: 74 MMHG | HEIGHT: 68 IN | BODY MASS INDEX: 25.91 KG/M2 | OXYGEN SATURATION: 99 % | SYSTOLIC BLOOD PRESSURE: 118 MMHG | WEIGHT: 171 LBS | HEART RATE: 56 BPM

## 2022-06-02 DIAGNOSIS — G72.9 MYOPATHY, UNSPECIFIED: Primary | ICD-10-CM

## 2022-06-02 DIAGNOSIS — G56.03 BILATERAL CARPAL TUNNEL SYNDROME: ICD-10-CM

## 2022-06-02 PROCEDURE — 99205 OFFICE O/P NEW HI 60 MIN: CPT | Performed by: PSYCHIATRY & NEUROLOGY

## 2022-06-02 NOTE — LETTER
6/2/2022         RE: Derrick Andrade  [address]  [facility] [address]        Dear Colleague,    Thank you for referring your patient, Derrick Andrade, to the [facility]IN[facility]. Please see a copy of my visit note below.        [facility]    Derrick Andrade MRN# [id number]   Age: 51 year old YOB: 1970     Requesting physician: Kwabena Ariza            Assessment and Plan:   Assessment:  1.  Tubular aggregate myopathy  2.  Bilateral Carpal Tunnel Syndrome     Plan:  1.  Needs updated EMG and if CTS confirmed once again - surgical treatment.  He will discuss with his primary MD  2.  Telephone follow up with Dr. Wade regarding possibility of any new treatment options for myopathy - specifically immune related medications    I am not a neuromuscular specialist and I am unable to answer the 3 questions he posed noted below pertaining to updated technology for diagnosis, potential therapeutic interventions with some of the newer immune related therapies, or whether a muscle biopsy might be helpful.  I suggested that perhaps just a telephone call with Dr. Wade might address these issues and attempted to schedule I brief 15-minute call.    I do believe however that his carpal tunnel syndrome needs to be addressed as there is possibility of substantial improvement in those symptoms.  He indicated that he would arrange this through his primary physician in [place].    I did attempt to reassure him that despite the very prolonged course of his symptoms, if he does have myopathy, it has not dropped him of his independence nor will it in the future as his muscle power, tone, bulk and function remains normal.             History of Present Illness:   CC:  I met with Mr. Andrade and his wife today for 60 minutes to review his issues with extremity discomfort.  He has had symptoms of pain and stiffness in his calves and forearms  dating back almost 35 years and has undergone extensive neurological review and testing through [facility] in [place], by Dr. King Goode at [facility], at the [facility] in [place] through Dr. Hensley, and most recently by Dr. Dr. Wade here at [place]  in 2018.  He has had EMG testing, genetic testing, lab work, and muscle biopsy looking for all forms of pathology and has been felt to have a sporadic tubular aggregate myopathy.  There is no specific intervention available for this and instead his symptoms have been treated symptomatically.  He has not however found any significant relief from muscle relaxants, anti-inflammatories, analgesics, or any other neurological interventions.  He has been disabled from gainful employment since 2005.  He believes that he is getting worse and that the stiffness and discomfort in his legs is worse as is stiffness discomfort and numbness in his forearm and hands since his last visit in 2018.  At that time however he was also identified as having carpal tunnel syndrome on EMG testing and recommended to see a hand surgeon locally but never did so.    Although he has power, he states that his stamina is substantially impaired and that when he attempts to do physical activities his symptoms are worse.  They are present day and night, interfere with his sleep, and are only episodically relieved with the use of Toradol or oxycodone.  Symptoms do interfere with his sleep as well as his daily activity.  He made his visit today to seek neurological follow-up to ask 3 questions:    1)  If there was any new technology available to establish a diagnosis?   2) Whether there were any new therapeutic interventions?, and   3) Whether another biopsy would be useful?    He does not have any other neurological complaints and his neurologic review of systems is noncontributory.  His activity level dictates his symptoms and he indicates that he is substantially impaired at  least 2 to 4 days each week.               Physical Exam:   His neurological examination is reassuringly normal upon review of his mental status, cranial nerves, motor, sensory, coordination Station gait and reflexes.  His muscle bulk and tone appear to be completely normal as well.         Data:   All laboratory data reviewed  All imaging studies reviewed by me             DATA for DOCUMENTATION:         Past Medical History:     Patient Active Problem List   Diagnosis     Persistent insomnia     Dyslipidemia     Essential hypertension     Hyperlipidemia     Essential hypertension, benign     Hypogonadism in male     Long term current use of opiate analgesic     Multinodular goiter     Mononucleosis     Muscle disorder     Myopathy     Osteopenia     General symptom     Encounter for general adult medical examination without abnormal findings     Vitamin D deficiency     No past medical history on file.    Also see scanned health assessment forms.       Past Surgical History:   No past surgical history on file.         Social History:     Social History     Socioeconomic History     Marital status:      Spouse name: Not on file     Number of children: Not on file     Years of education: Not on file     Highest education level: Not on file   Occupational History     Not on file   Tobacco Use     Smoking status: Never Smoker     Smokeless tobacco: Never Used   Substance and Sexual Activity     Alcohol use: Not on file     Drug use: Not on file     Sexual activity: Not on file   Other Topics Concern     Not on file   Social History Narrative     Not on file     Social Determinants of Health     Financial Resource Strain: Not on file   Food Insecurity: Not on file   Transportation Needs: Not on file   Physical Activity: Not on file   Stress: Not on file   Social Connections: Not on file   Intimate Partner Violence: Not on file   Housing Stability: Not on file              Family History:   No family history on  file.         Medications:     Current Outpatient Medications   Medication Sig     atenolol (TENORMIN) 25 MG tablet Take 1 tablet by mouth once     calcium carbonate-vitamin D 600-200 MG-UNIT CAPS Take 1 tablet by mouth daily as needed     ibuprofen (ADVIL,MOTRIN) 800 MG tablet Take 1 tablet by mouth 3 times daily (before meals)     ibuprofen (ADVIL,MOTRIN) 800 MG tablet Take 1 tablet by mouth 3 times daily (before meals)     levothyroxine (SYNTHROID/LEVOTHROID) 150 MCG tablet TK 1 T PO D.     LORazepam (ATIVAN) 1 MG tablet Take 1 tablet (1 mg) by mouth once as needed for anxiety Take 30 minutes prior to departure.  Do not operate a vehicle after taking this medication     omeprazole (PRILOSEC) 20 MG CR capsule Take 1 capsule (20 mg) by mouth daily     oxyCODONE IR (ROXICODONE) 5 MG tablet Take 5 mg by mouth as needed     simvastatin (ZOCOR) 10 MG tablet Take 1 tablet by mouth once     testosterone cypionate (DEPOTESTOTERONE CYPIONATE) 200 MG/ML injection Inject 200 mg into the muscle every 21 days     traMADol (ULTRAM) 50 MG tablet Take 100 mg by mouth     traMADol (ULTRAM) 50 MG tablet Take 50 mg by mouth 6 times daily     zolpidem (AMBIEN) 10 MG tablet Take 10 mg by mouth once     No current facility-administered medications for this visit.              Review of Systems:   A comprehensive 10 point review of systems (constitutional, ENT, cardiac, peripheral vascular, lymphatic, respiratory, GI, , Musculoskeletal, skin, Neurological) was performed and found to be negative except as described in this note.     See intake form completed by patient        Again, thank you for allowing me to participate in the care of your patient.        Sincerely,        William Bustillo MD, MD

## 2022-06-02 NOTE — PROGRESS NOTES
[facility]    Derrick Andrade MRN# [id number]   Age: 51 year old YOB: 1970     Requesting physician: Kwabena Ariza            Assessment and Plan:   Assessment:  1.  Tubular aggregate myopathy  2.  Bilateral Carpal Tunnel Syndrome     Plan:  1.  Needs updated EMG and if CTS confirmed once again - surgical treatment.  He will discuss with his primary MD  2.  Telephone follow up with Dr. Wade regarding possibility of any new treatment options for myopathy - specifically immune related medications    I am not a neuromuscular specialist and I am unable to answer the 3 questions he posed noted below pertaining to updated technology for diagnosis, potential therapeutic interventions with some of the newer immune related therapies, or whether a muscle biopsy might be helpful.  I suggested that perhaps just a telephone call with Dr. Wade might address these issues and attempted to schedule I brief 15-minute call.    I do believe however that his carpal tunnel syndrome needs to be addressed as there is possibility of substantial improvement in those symptoms.  He indicated that he would arrange this through his primary physician in [place].    I did attempt to reassure him that despite the very prolonged course of his symptoms, if he does have myopathy, it has not dropped him of his independence nor will it in the future as his muscle power, tone, bulk and function remains normal.             History of Present Illness:   CC:  I met with Mr. Andrade and his wife today for 60 minutes to review his issues with extremity discomfort.  He has had symptoms of pain and stiffness in his calves and forearms dating back almost 35 years and has undergone extensive neurological review and testing through [facility] in [place], by Dr. King Goode at [facility], at the [facility] in [place] through Dr. Hensley, and most recently by Dr. López  Mauro here at [place]  in 2018.  He has had EMG testing, genetic testing, lab work, and muscle biopsy looking for all forms of pathology and has been felt to have a sporadic tubular aggregate myopathy.  There is no specific intervention available for this and instead his symptoms have been treated symptomatically.  He has not however found any significant relief from muscle relaxants, anti-inflammatories, analgesics, or any other neurological interventions.  He has been disabled from gainful employment since 2005.  He believes that he is getting worse and that the stiffness and discomfort in his legs is worse as is stiffness discomfort and numbness in his forearm and hands since his last visit in 2018.  At that time however he was also identified as having carpal tunnel syndrome on EMG testing and recommended to see a hand surgeon locally but never did so.    Although he has power, he states that his stamina is substantially impaired and that when he attempts to do physical activities his symptoms are worse.  They are present day and night, interfere with his sleep, and are only episodically relieved with the use of Toradol or oxycodone.  Symptoms do interfere with his sleep as well as his daily activity.  He made his visit today to seek neurological follow-up to ask 3 questions:    1)  If there was any new technology available to establish a diagnosis?   2) Whether there were any new therapeutic interventions?, and   3) Whether another biopsy would be useful?    He does not have any other neurological complaints and his neurologic review of systems is noncontributory.  His activity level dictates his symptoms and he indicates that he is substantially impaired at least 2 to 4 days each week.               Physical Exam:   His neurological examination is reassuringly normal upon review of his mental status, cranial nerves, motor, sensory, coordination Station gait and reflexes.  His muscle bulk and tone appear  to be completely normal as well.         Data:   All laboratory data reviewed  All imaging studies reviewed by me             DATA for DOCUMENTATION:         Past Medical History:     Patient Active Problem List   Diagnosis     Persistent insomnia     Dyslipidemia     Essential hypertension     Hyperlipidemia     Essential hypertension, benign     Hypogonadism in male     Long term current use of opiate analgesic     Multinodular goiter     Mononucleosis     Muscle disorder     Myopathy     Osteopenia     General symptom     Encounter for general adult medical examination without abnormal findings     Vitamin D deficiency     No past medical history on file.    Also see scanned health assessment forms.       Past Surgical History:   No past surgical history on file.         Social History:     Social History     Socioeconomic History     Marital status:      Spouse name: Not on file     Number of children: Not on file     Years of education: Not on file     Highest education level: Not on file   Occupational History     Not on file   Tobacco Use     Smoking status: Never Smoker     Smokeless tobacco: Never Used   Substance and Sexual Activity     Alcohol use: Not on file     Drug use: Not on file     Sexual activity: Not on file   Other Topics Concern     Not on file   Social History Narrative     Not on file     Social Determinants of Health     Financial Resource Strain: Not on file   Food Insecurity: Not on file   Transportation Needs: Not on file   Physical Activity: Not on file   Stress: Not on file   Social Connections: Not on file   Intimate Partner Violence: Not on file   Housing Stability: Not on file              Family History:   No family history on file.         Medications:     Current Outpatient Medications   Medication Sig     atenolol (TENORMIN) 25 MG tablet Take 1 tablet by mouth once     calcium carbonate-vitamin D 600-200 MG-UNIT CAPS Take 1 tablet by mouth daily as needed     ibuprofen  (ADVIL,MOTRIN) 800 MG tablet Take 1 tablet by mouth 3 times daily (before meals)     ibuprofen (ADVIL,MOTRIN) 800 MG tablet Take 1 tablet by mouth 3 times daily (before meals)     levothyroxine (SYNTHROID/LEVOTHROID) 150 MCG tablet TK 1 T PO D.     LORazepam (ATIVAN) 1 MG tablet Take 1 tablet (1 mg) by mouth once as needed for anxiety Take 30 minutes prior to departure.  Do not operate a vehicle after taking this medication     omeprazole (PRILOSEC) 20 MG CR capsule Take 1 capsule (20 mg) by mouth daily     oxyCODONE IR (ROXICODONE) 5 MG tablet Take 5 mg by mouth as needed     simvastatin (ZOCOR) 10 MG tablet Take 1 tablet by mouth once     testosterone cypionate (DEPOTESTOTERONE CYPIONATE) 200 MG/ML injection Inject 200 mg into the muscle every 21 days     traMADol (ULTRAM) 50 MG tablet Take 100 mg by mouth     traMADol (ULTRAM) 50 MG tablet Take 50 mg by mouth 6 times daily     zolpidem (AMBIEN) 10 MG tablet Take 10 mg by mouth once     No current facility-administered medications for this visit.              Review of Systems:   A comprehensive 10 point review of systems (constitutional, ENT, cardiac, peripheral vascular, lymphatic, respiratory, GI, , Musculoskeletal, skin, Neurological) was performed and found to be negative except as described in this note.     See intake form completed by patient

## 2022-06-02 NOTE — NURSING NOTE
"Derrick Andrade is a 51 year old male who presents for:  Chief Complaint   Patient presents with     Consult     Myopathy        Initial Vitals:  /74   Pulse 56   Ht 1.727 m (5' 8\")   Wt 77.6 kg (171 lb)   SpO2 99%   BMI 26.00 kg/m   Estimated body mass index is 26 kg/m  as calculated from the following:    Height as of this encounter: 1.727 m (5' 8\").    Weight as of this encounter: 77.6 kg (171 lb).. Body surface area is 1.93 meters squared. BP completed using cuff size: regular    Nursing Comments:     Micaela Acosta    "

## 2022-06-22 ENCOUNTER — TELEPHONE (OUTPATIENT)
Dept: NEUROLOGY | Facility: CLINIC | Age: 52
End: 2022-06-22

## 2022-06-22 NOTE — TELEPHONE ENCOUNTER
Attempted to call patient from staff message to rescheduled cancelled appointment from 6-22-22. Patient not available and no voice mail message system.

## 2022-12-26 ENCOUNTER — HEALTH MAINTENANCE LETTER (OUTPATIENT)
Age: 52
End: 2022-12-26

## 2023-04-16 ENCOUNTER — HEALTH MAINTENANCE LETTER (OUTPATIENT)
Age: 53
End: 2023-04-16

## 2023-09-26 ENCOUNTER — TRANSCRIBE ORDERS (OUTPATIENT)
Dept: OTHER | Age: 53
End: 2023-09-26

## 2023-09-26 ENCOUNTER — MEDICAL CORRESPONDENCE (OUTPATIENT)
Dept: HEALTH INFORMATION MANAGEMENT | Facility: CLINIC | Age: 53
End: 2023-09-26
Payer: COMMERCIAL

## 2023-09-26 DIAGNOSIS — E34.9 HYPOTESTOSTERONISM: Primary | ICD-10-CM

## 2024-06-23 ENCOUNTER — HEALTH MAINTENANCE LETTER (OUTPATIENT)
Age: 54
End: 2024-06-23

## 2024-11-11 ENCOUNTER — LAB (OUTPATIENT)
Dept: LAB | Facility: OTHER | Age: 54
End: 2024-11-11
Attending: INTERNAL MEDICINE
Payer: MEDICARE

## 2024-11-11 DIAGNOSIS — D75.1 POLYCYTHEMIA: ICD-10-CM

## 2024-11-11 DIAGNOSIS — D75.9 HYPERVISCOSITY: ICD-10-CM

## 2024-11-11 LAB — HGB BLD-MCNC: 16.4 G/DL (ref 13.3–17.7)

## 2024-11-11 PROCEDURE — 36415 COLL VENOUS BLD VENIPUNCTURE: CPT | Mod: ZL

## 2024-11-11 PROCEDURE — 85018 HEMOGLOBIN: CPT | Mod: ZL

## 2024-11-14 DIAGNOSIS — D75.9 HYPERVISCOSITY: Primary | ICD-10-CM

## 2024-11-15 ENCOUNTER — LAB (OUTPATIENT)
Dept: LAB | Facility: OTHER | Age: 54
End: 2024-11-15
Payer: MEDICARE

## 2024-11-15 ENCOUNTER — HOSPITAL ENCOUNTER (OUTPATIENT)
Dept: INFUSION THERAPY | Facility: OTHER | Age: 54
Discharge: HOME | End: 2024-11-15
Payer: MEDICARE

## 2024-11-15 VITALS — SYSTOLIC BLOOD PRESSURE: 122 MMHG | HEART RATE: 63 BPM | DIASTOLIC BLOOD PRESSURE: 88 MMHG

## 2024-11-15 DIAGNOSIS — D75.9 HYPERVISCOSITY: Primary | ICD-10-CM

## 2024-11-15 DIAGNOSIS — D75.1 POLYCYTHEMIA: ICD-10-CM

## 2024-11-15 DIAGNOSIS — D75.9 HYPERVISCOSITY: ICD-10-CM

## 2024-11-15 LAB — BLOOD COLLECTION: 227 MLS

## 2024-11-15 PROCEDURE — 99195 PHLEBOTOMY: CPT | Mod: ZL

## 2024-11-15 PROCEDURE — 258N000003 HC RX IP 258 OP 636: Performed by: INTERNAL MEDICINE

## 2024-11-15 RX ADMIN — SODIUM CHLORIDE 500 ML: 9 INJECTION, SOLUTION INTRAVENOUS at 15:26

## 2024-11-15 NOTE — NURSING NOTE
Infusion Nursing Note:  Derrick Andrade presents today for IV fluids post phlebotomy.    Patient seen by provider today: No   present during visit today: Not Applicable.    Note: N/A.    Intravenous Access:  Peripheral IV placed.    Treatment Conditions:  Not Applicable.    Post Infusion Assessment:  Patient tolerated infusion without incident.  Blood return noted pre and post infusion.  Site patent and intact, free from redness, edema or discomfort.  No evidence of extravasations.  Access discontinued per protocol.       Discharge Plan:   Discharge instructions reviewed with: Patient.  Patient and/or family verbalized understanding of discharge instructions and all questions answered.  Copy of AVS declined by patient and/or family.  Patient will call for next appointment.  AVS to patient via [employer].    Patient discharged in stable condition accompanied by: self.  Departure Mode: Ambulatory.      Joseline Johnson RN

## 2024-11-20 DIAGNOSIS — D75.1 POLYCYTHEMIA: ICD-10-CM

## 2024-11-20 DIAGNOSIS — D75.9 HYPERVISCOSITY: Primary | ICD-10-CM

## 2024-12-04 ENCOUNTER — LAB (OUTPATIENT)
Dept: LAB | Facility: OTHER | Age: 54
End: 2024-12-04
Attending: FAMILY MEDICINE
Payer: MEDICARE

## 2024-12-04 DIAGNOSIS — D75.1 POLYCYTHEMIA: ICD-10-CM

## 2024-12-04 DIAGNOSIS — D75.9 HYPERVISCOSITY: ICD-10-CM

## 2024-12-04 LAB — HGB BLD-MCNC: 15.9 G/DL (ref 13.3–17.7)

## 2024-12-04 PROCEDURE — 36415 COLL VENOUS BLD VENIPUNCTURE: CPT | Mod: ZL

## 2024-12-04 PROCEDURE — 85018 HEMOGLOBIN: CPT | Mod: ZL

## 2024-12-06 DIAGNOSIS — D75.9 HYPERVISCOSITY: ICD-10-CM

## 2024-12-06 DIAGNOSIS — D75.1 POLYCYTHEMIA: Primary | ICD-10-CM

## 2024-12-09 ENCOUNTER — HOSPITAL ENCOUNTER (OUTPATIENT)
Dept: INFUSION THERAPY | Facility: OTHER | Age: 54
Discharge: HOME | End: 2024-12-09
Payer: MEDICARE

## 2024-12-09 ENCOUNTER — TELEPHONE (OUTPATIENT)
Dept: INFUSION THERAPY | Facility: OTHER | Age: 54
End: 2024-12-09
Payer: COMMERCIAL

## 2024-12-09 ENCOUNTER — LAB (OUTPATIENT)
Dept: LAB | Facility: OTHER | Age: 54
End: 2024-12-09
Payer: MEDICARE

## 2024-12-09 VITALS — HEART RATE: 67 BPM | SYSTOLIC BLOOD PRESSURE: 115 MMHG | RESPIRATION RATE: 16 BRPM | DIASTOLIC BLOOD PRESSURE: 79 MMHG

## 2024-12-09 DIAGNOSIS — D75.1 POLYCYTHEMIA: ICD-10-CM

## 2024-12-09 DIAGNOSIS — D75.9 HYPERVISCOSITY: ICD-10-CM

## 2024-12-09 DIAGNOSIS — D75.9 HYPERVISCOSITY: Primary | ICD-10-CM

## 2024-12-09 LAB — BLOOD COLLECTION: 500 MLS

## 2024-12-09 PROCEDURE — 99195 PHLEBOTOMY: CPT | Mod: ZL

## 2024-12-09 PROCEDURE — 258N000003 HC RX IP 258 OP 636: Performed by: INTERNAL MEDICINE

## 2024-12-09 PROCEDURE — 96360 HYDRATION IV INFUSION INIT: CPT

## 2024-12-09 RX ADMIN — SODIUM CHLORIDE 500 ML: 9 INJECTION, SOLUTION INTRAVENOUS at 15:16

## 2024-12-09 NOTE — NURSING NOTE
Spoke with Dr Ritchie's nurse [place] RN and they want therapeutic phlebotomy even at 15.9 Hgb from lab results on 12/4.    Jean S. Hammann, RN on 12/9/2024 at 1:49 PM

## 2024-12-09 NOTE — NURSING NOTE
Infusion Nursing Note:  Derrick Andrade presents today for IV Fluids.    Patient seen by provider today: No   present during visit today: Not Applicable.    Note: N/A.      Intravenous Access:  Peripheral IV placed.    Treatment Conditions:  Not Applicable.      Post Infusion Assessment:  Patient tolerated infusion without incident.  Blood return noted pre and post infusion.  Site patent and intact, free from redness, edema or discomfort.  No evidence of extravasations.  Access discontinued per protocol.       Discharge Plan:   Discharge instructions reviewed with: Patient and wife.  Patient and/or family verbalized understanding of discharge instructions and all questions answered.  Copy of AVS declined.  AVS to patient via [employer].    Patient discharged in stable condition accompanied by: wife.  Departure Mode: Ambulatory.      Melissa Stokes RN

## 2025-01-06 ENCOUNTER — LAB (OUTPATIENT)
Dept: LAB | Facility: OTHER | Age: 55
End: 2025-01-06
Attending: INTERNAL MEDICINE
Payer: MEDICARE

## 2025-01-06 DIAGNOSIS — D75.1 POLYCYTHEMIA: ICD-10-CM

## 2025-01-06 DIAGNOSIS — D75.9 HYPERVISCOSITY: ICD-10-CM

## 2025-01-06 LAB — HGB BLD-MCNC: 15.8 G/DL (ref 13.3–17.7)

## 2025-01-06 PROCEDURE — 36415 COLL VENOUS BLD VENIPUNCTURE: CPT | Mod: ZL

## 2025-01-06 PROCEDURE — 85018 HEMOGLOBIN: CPT | Mod: ZL

## 2025-01-13 ENCOUNTER — LAB (OUTPATIENT)
Dept: LAB | Facility: OTHER | Age: 55
End: 2025-01-13
Payer: MEDICARE

## 2025-01-13 ENCOUNTER — HOSPITAL ENCOUNTER (OUTPATIENT)
Dept: INFUSION THERAPY | Facility: OTHER | Age: 55
Discharge: HOME | End: 2025-01-13
Payer: MEDICARE

## 2025-01-13 VITALS — DIASTOLIC BLOOD PRESSURE: 64 MMHG | HEART RATE: 53 BPM | SYSTOLIC BLOOD PRESSURE: 97 MMHG

## 2025-01-13 DIAGNOSIS — D75.1 POLYCYTHEMIA: ICD-10-CM

## 2025-01-13 DIAGNOSIS — D75.9 HYPERVISCOSITY: ICD-10-CM

## 2025-01-13 DIAGNOSIS — D75.9 HYPERVISCOSITY: Primary | ICD-10-CM

## 2025-01-13 LAB
BLOOD COLLECTION: 500 MLS
HGB BLD-MCNC: 16.4 G/DL (ref 13.3–17.7)

## 2025-01-13 PROCEDURE — 96360 HYDRATION IV INFUSION INIT: CPT

## 2025-01-13 PROCEDURE — 36415 COLL VENOUS BLD VENIPUNCTURE: CPT | Mod: ZL

## 2025-01-13 PROCEDURE — 99195 PHLEBOTOMY: CPT | Mod: ZL

## 2025-01-13 PROCEDURE — 258N000003 HC RX IP 258 OP 636: Performed by: INTERNAL MEDICINE

## 2025-01-13 PROCEDURE — 85018 HEMOGLOBIN: CPT | Mod: ZL

## 2025-01-13 RX ADMIN — SODIUM CHLORIDE 500 ML: 9 INJECTION, SOLUTION INTRAVENOUS at 14:55

## 2025-01-13 NOTE — NURSING NOTE
Infusion Nursing Note:  Derrick Andrade presents today for IV fluids post phlebotomy.    Patient seen by provider today: No   present during visit today: Not Applicable.    Note: N/A.    Intravenous Access:  Peripheral IV placed.    Treatment Conditions:  Lab Results   Component Value Date    HGB 16.4 01/13/2025     Post Infusion Assessment:  Patient tolerated infusion without incident.  Blood return noted pre and post infusion.  Site patent and intact, free from redness, edema or discomfort.  No evidence of extravasations.  Access discontinued per protocol.     Discharge Plan:   Discharge instructions reviewed with: Patient.  Patient and/or family verbalized understanding of discharge instructions and all questions answered.  Copy of AVS reviewed with patient and/or family.  Patient has no future appointments.   AVS to patient via [employer].    Patient discharged in stable condition accompanied by: self.  Departure Mode: Ambulatory.      Joseline Johnson RN

## 2025-03-24 ENCOUNTER — LAB (OUTPATIENT)
Dept: LAB | Facility: OTHER | Age: 55
End: 2025-03-24
Payer: MEDICARE

## 2025-03-24 ENCOUNTER — HOSPITAL ENCOUNTER (OUTPATIENT)
Dept: INFUSION THERAPY | Facility: OTHER | Age: 55
Discharge: HOME | End: 2025-03-24
Payer: MEDICARE

## 2025-03-24 ENCOUNTER — APPOINTMENT (OUTPATIENT)
Dept: LAB | Facility: OTHER | Age: 55
End: 2025-03-24
Payer: MEDICARE

## 2025-03-24 VITALS
DIASTOLIC BLOOD PRESSURE: 75 MMHG | SYSTOLIC BLOOD PRESSURE: 109 MMHG | HEART RATE: 53 BPM | RESPIRATION RATE: 14 BRPM | TEMPERATURE: 98 F

## 2025-03-24 DIAGNOSIS — D75.9 HYPERVISCOSITY: ICD-10-CM

## 2025-03-24 DIAGNOSIS — D75.1 POLYCYTHEMIA: ICD-10-CM

## 2025-03-24 DIAGNOSIS — D75.9 HYPERVISCOSITY: Primary | ICD-10-CM

## 2025-03-24 LAB
ERYTHROCYTE [DISTWIDTH] IN BLOOD BY AUTOMATED COUNT: 12.4 % (ref 10–15)
HCT VFR BLD AUTO: 46 % (ref 40–53)
HGB BLD-MCNC: 15.9 G/DL (ref 13.3–17.7)
MCH RBC QN AUTO: 28.7 PG (ref 26.5–33)
MCHC RBC AUTO-ENTMCNC: 34.6 G/DL (ref 31.5–36.5)
MCV RBC AUTO: 83 FL (ref 78–100)
PLATELET # BLD AUTO: 159 10E3/UL (ref 150–450)
RBC # BLD AUTO: 5.54 10E6/UL (ref 4.4–5.9)
WBC # BLD AUTO: 6.4 10E3/UL (ref 4–11)

## 2025-03-24 PROCEDURE — 96360 HYDRATION IV INFUSION INIT: CPT

## 2025-03-24 PROCEDURE — 36415 COLL VENOUS BLD VENIPUNCTURE: CPT | Mod: ZL

## 2025-03-24 PROCEDURE — 258N000003 HC RX IP 258 OP 636: Performed by: INTERNAL MEDICINE

## 2025-03-24 PROCEDURE — 85027 COMPLETE CBC AUTOMATED: CPT | Mod: ZL

## 2025-03-24 PROCEDURE — 99195 PHLEBOTOMY: CPT

## 2025-03-24 RX ADMIN — SODIUM CHLORIDE 500 ML: 0.9 INJECTION, SOLUTION INTRAVENOUS at 14:55

## 2025-03-24 NOTE — NURSING NOTE
Infusion Nursing Note:  Derrick Andrade presents today for IV Fluids post phlebotomy.    Patient seen by provider today: No   present during visit today: Not Applicable.    Note: N/A.      Intravenous Access:  Peripheral IV placed.    Treatment Conditions:  Phlebotomy completed today per orders.      Post Infusion Assessment:  Patient tolerated infusion without incident.  Blood return noted pre and post infusion.  Site patent and intact, free from redness, edema or discomfort.  No evidence of extravasations.  Access discontinued per protocol.       Discharge Plan:   Discharge instructions reviewed with: Patient and wife.  Patient and/or family verbalized understanding of discharge instructions and all questions answered.  Copy of AVS declined. Patient will return as needed for next appointment.  AVS to patient via [employer].   Patient discharged in stable condition accompanied by: wife.  Departure Mode: Ambulatory.      Melissa Stokes RN

## 2025-03-26 ENCOUNTER — MEDICAL CORRESPONDENCE (OUTPATIENT)
Dept: HEALTH INFORMATION MANAGEMENT | Facility: OTHER | Age: 55
End: 2025-03-26
Payer: COMMERCIAL

## 2025-03-27 ENCOUNTER — PATIENT OUTREACH (OUTPATIENT)
Dept: ONCOLOGY | Facility: OTHER | Age: 55
End: 2025-03-27
Payer: COMMERCIAL

## 2025-03-27 NOTE — PROGRESS NOTES
Oncology/Hematology Care Coordination - Referral Review    Referred by:  Nancy-Dr. Koffi Ackerman  Has seen Dr. Ritchie - last phlebotomy 3/24/25 at [facility]    Diagnosis:  polycythemia    Most recent Imaging:      Most recent Lab:  CBC 3/24 @[facility]    Surgery/Biopsy:      Pathology:      Outside Records:      Jazlyn Lemus RN on 3/27/2025 at 3:25 PM

## 2025-06-11 ENCOUNTER — LAB (OUTPATIENT)
Dept: LAB | Facility: OTHER | Age: 55
End: 2025-06-11
Payer: MEDICARE

## 2025-06-11 ENCOUNTER — HOSPITAL ENCOUNTER (OUTPATIENT)
Dept: INFUSION THERAPY | Facility: OTHER | Age: 55
Discharge: HOME | End: 2025-06-11
Payer: MEDICARE

## 2025-06-11 VITALS
TEMPERATURE: 97 F | DIASTOLIC BLOOD PRESSURE: 71 MMHG | SYSTOLIC BLOOD PRESSURE: 108 MMHG | RESPIRATION RATE: 16 BRPM | HEART RATE: 62 BPM

## 2025-06-11 DIAGNOSIS — D75.1 POLYCYTHEMIA: ICD-10-CM

## 2025-06-11 DIAGNOSIS — D75.9 HYPERVISCOSITY: Primary | ICD-10-CM

## 2025-06-11 DIAGNOSIS — D75.9 HYPERVISCOSITY: ICD-10-CM

## 2025-06-11 LAB
BLOOD COLLECTION: 500 MLS
HGB BLD-MCNC: 15.5 G/DL (ref 13.3–17.7)
MCV RBC AUTO: 83 FL (ref 78–100)

## 2025-06-11 PROCEDURE — 99195 PHLEBOTOMY: CPT | Mod: ZL

## 2025-06-11 PROCEDURE — 85018 HEMOGLOBIN: CPT | Mod: ZL

## 2025-06-11 PROCEDURE — 258N000003 HC RX IP 258 OP 636: Performed by: INTERNAL MEDICINE

## 2025-06-11 PROCEDURE — 36415 COLL VENOUS BLD VENIPUNCTURE: CPT | Mod: ZL

## 2025-06-11 RX ADMIN — SODIUM CHLORIDE 500 ML: 9 INJECTION, SOLUTION INTRAVENOUS at 13:36

## 2025-06-11 NOTE — NURSING NOTE
Infusion Nursing Note:  Derrick Andrade presents today for IV fluids bolus post phlebotomy.    Patient seen by provider today: No   present during visit today: Not Applicable.    Note: N/A.      Intravenous Access:  Peripheral IV placed.    Treatment Conditions:  Not Applicable.      Post Infusion Assessment:  Patient tolerated infusion without incident.   IV removed.      Discharge Plan:   Discharge instructions reviewed with: Patient.  Patient and/or family verbalized understanding of discharge instructions and all questions answered.  Patient discharged in stable condition accompanied by: self and wife.      Eve Fox RN

## 2025-07-08 ENCOUNTER — ONCOLOGY VISIT (OUTPATIENT)
Dept: ONCOLOGY | Facility: OTHER | Age: 55
End: 2025-07-08
Payer: MEDICARE

## 2025-07-08 VITALS
SYSTOLIC BLOOD PRESSURE: 132 MMHG | RESPIRATION RATE: 16 BRPM | HEART RATE: 58 BPM | DIASTOLIC BLOOD PRESSURE: 82 MMHG | BODY MASS INDEX: 26.37 KG/M2 | HEIGHT: 68 IN | TEMPERATURE: 97.7 F | WEIGHT: 174 LBS | OXYGEN SATURATION: 97 %

## 2025-07-08 DIAGNOSIS — D75.1 POLYCYTHEMIA: Primary | ICD-10-CM

## 2025-07-08 DIAGNOSIS — R42 DIZZINESS: ICD-10-CM

## 2025-07-08 DIAGNOSIS — R71.8 OTHER ABNORMALITY OF RED BLOOD CELLS: ICD-10-CM

## 2025-07-08 LAB
ALBUMIN SERPL BCG-MCNC: 4.2 G/DL (ref 3.5–5.2)
ALP SERPL-CCNC: 94 U/L (ref 40–150)
ALT SERPL W P-5'-P-CCNC: 37 U/L (ref 0–70)
ANION GAP SERPL CALCULATED.3IONS-SCNC: 10 MMOL/L (ref 7–15)
AST SERPL W P-5'-P-CCNC: 29 U/L (ref 0–45)
BASOPHILS # BLD AUTO: 0 10E3/UL (ref 0–0.2)
BASOPHILS NFR BLD AUTO: 0 %
BILIRUB SERPL-MCNC: 0.5 MG/DL
BUN SERPL-MCNC: 18.9 MG/DL (ref 6–20)
CALCIUM SERPL-MCNC: 8.8 MG/DL (ref 8.8–10.4)
CHLORIDE SERPL-SCNC: 103 MMOL/L (ref 98–107)
CREAT SERPL-MCNC: 1.16 MG/DL (ref 0.67–1.17)
EGFRCR SERPLBLD CKD-EPI 2021: 74 ML/MIN/1.73M2
EOSINOPHIL # BLD AUTO: 0.2 10E3/UL (ref 0–0.7)
EOSINOPHIL NFR BLD AUTO: 3 %
ERYTHROCYTE [DISTWIDTH] IN BLOOD BY AUTOMATED COUNT: 13.5 % (ref 10–15)
FERRITIN SERPL-MCNC: 20 NG/ML (ref 31–409)
GLUCOSE SERPL-MCNC: 115 MG/DL (ref 70–99)
HCO3 SERPL-SCNC: 26 MMOL/L (ref 22–29)
HCT VFR BLD AUTO: 44.6 % (ref 40–53)
HGB BLD-MCNC: 15.1 G/DL (ref 13.3–17.7)
IMM GRANULOCYTES # BLD: 0 10E3/UL
IMM GRANULOCYTES NFR BLD: 1 %
IRON BINDING CAPACITY (ROCHE): 325 UG/DL (ref 240–430)
IRON SATN MFR SERPL: 16 % (ref 15–46)
IRON SERPL-MCNC: 51 UG/DL (ref 61–157)
LYMPHOCYTES # BLD AUTO: 1.2 10E3/UL (ref 0.8–5.3)
LYMPHOCYTES NFR BLD AUTO: 21 %
MCH RBC QN AUTO: 28 PG (ref 26.5–33)
MCHC RBC AUTO-ENTMCNC: 33.9 G/DL (ref 31.5–36.5)
MCV RBC AUTO: 83 FL (ref 78–100)
MONOCYTES # BLD AUTO: 0.3 10E3/UL (ref 0–1.3)
MONOCYTES NFR BLD AUTO: 6 %
NEUTROPHILS # BLD AUTO: 3.8 10E3/UL (ref 1.6–8.3)
NEUTROPHILS NFR BLD AUTO: 68 %
NRBC # BLD AUTO: 0 10E3/UL
NRBC BLD AUTO-RTO: 0 /100
PLATELET # BLD AUTO: 156 10E3/UL (ref 150–450)
POTASSIUM SERPL-SCNC: 3.7 MMOL/L (ref 3.4–5.3)
PROT SERPL-MCNC: 6.3 G/DL (ref 6.4–8.3)
RBC # BLD AUTO: 5.4 10E6/UL (ref 4.4–5.9)
SODIUM SERPL-SCNC: 139 MMOL/L (ref 135–145)
WBC # BLD AUTO: 5.6 10E3/UL (ref 4–11)

## 2025-07-08 PROCEDURE — 85018 HEMOGLOBIN: CPT | Mod: ZL | Performed by: INTERNAL MEDICINE

## 2025-07-08 PROCEDURE — 36415 COLL VENOUS BLD VENIPUNCTURE: CPT | Mod: ZL | Performed by: INTERNAL MEDICINE

## 2025-07-08 PROCEDURE — 82728 ASSAY OF FERRITIN: CPT | Mod: ZL | Performed by: INTERNAL MEDICINE

## 2025-07-08 PROCEDURE — 82040 ASSAY OF SERUM ALBUMIN: CPT | Mod: ZL | Performed by: INTERNAL MEDICINE

## 2025-07-08 PROCEDURE — 83540 ASSAY OF IRON: CPT | Mod: ZL | Performed by: INTERNAL MEDICINE

## 2025-07-08 PROCEDURE — G0463 HOSPITAL OUTPT CLINIC VISIT: HCPCS

## 2025-07-08 RX ORDER — ATORVASTATIN CALCIUM 80 MG/1
80 TABLET, FILM COATED ORAL AT BEDTIME
COMMUNITY

## 2025-07-08 RX ORDER — FAMOTIDINE 20 MG/1
20 TABLET, FILM COATED ORAL EVERY EVENING
COMMUNITY
Start: 2023-07-21

## 2025-07-08 RX ORDER — ASPIRIN 81 MG/1
81 TABLET, CHEWABLE ORAL DAILY
COMMUNITY
Start: 2023-08-21

## 2025-07-08 RX ORDER — ALPRAZOLAM 1 MG/1
1 TABLET ORAL PRN
COMMUNITY
Start: 2025-03-26

## 2025-07-08 ASSESSMENT — PAIN SCALES - GENERAL: PAINLEVEL_OUTOF10: MODERATE PAIN (5)

## 2025-07-08 NOTE — PROGRESS NOTES
HEMATOLOGY CONSULT NOTE  Jul 8, 2025    Reason for consult: Polycythemia    HISTORY OF PRESENT ILLNESS  Derrick Andrade is a 54 year old male with PMH as stated below who is seen in the hematology clinic for polycythemia.    His history in short is as follows:    Mr. Andrade was initially found to have a hemoglobin of 17.2 on labs done 10/1/2024.  Following that he had hemoglobin that ranged between mid 16-14's.    Review of labs in Care Everywhere that showed slight increase in his hemoglobin since June 2019.    Was then evaluated by Dr. Ritchie in February 2023 and at that time was felt to have hyperviscosity symptoms such as weakness, pruritus.  Recommended to start phlebotomy.  He also had a JAK2 V6 17F mutation checked which was negative.    He then started taking testosterone in 2019.  He then presented to the emergency room on 8/18/2023 with left facial numbness tingling and tightness and left upper extremity weakness.  At that time he was hemoglobin was 19.  He was phlebotomized during the admission with improvement in symptoms.    Following his admission in August 2023 his phlebotomy parameters were changed to hemoglobin of 16 or higher.    He has been getting ongoing phlebotomies since then.  He complains of ongoing dizziness on and off.  He also continues to have numbness and tingling in his face, left arm and left leg.  This has been present since August 2023    REVIEW OF SYSTEMS  A 12-point ROS negative except as in HPI      Current Outpatient Medications   Medication Sig Dispense Refill    ALPRAZolam (XANAX) 1 MG tablet Take 1 mg by mouth as needed for anxiety. Prior to phlebotomies, dental or other procedures      aspirin (ASA) 81 MG chewable tablet Take 81 mg by mouth daily.      atenolol (TENORMIN) 50 MG tablet Take 50 mg by mouth once.      atorvastatin (LIPITOR) 80 MG tablet Take 80 mg by mouth at bedtime.      calcium carbonate-vitamin D 600-200 MG-UNIT CAPS Take 1 tablet by mouth daily.       "famotidine (PEPCID) 20 MG tablet Take 20 mg by mouth every evening.      levothyroxine (SYNTHROID/LEVOTHROID) 150 MCG tablet TK 1 T PO D.  3    omeprazole (PRILOSEC) 20 MG CR capsule Take 1 capsule (20 mg) by mouth daily 30 capsule 1    oxyCODONE IR (ROXICODONE) 5 MG tablet Take 5 mg by mouth as needed      testosterone cypionate (DEPOTESTOTERONE CYPIONATE) 200 MG/ML injection Inject 150 mg into the muscle every 14 days.      zolpidem (AMBIEN) 10 MG tablet Take 10 mg by mouth once      ibuprofen (ADVIL,MOTRIN) 800 MG tablet Take 1 tablet by mouth 3 times daily (before meals)      LORazepam (ATIVAN) 1 MG tablet Take 1 tablet (1 mg) by mouth once as needed for anxiety Take 30 minutes prior to departure.  Do not operate a vehicle after taking this medication (Patient not taking: Reported on 7/8/2025) 1 tablet 0    traMADol (ULTRAM) 50 MG tablet Take 100 mg by mouth every 6 hours as needed for breakthrough pain or moderate to severe pain.         Allergies   Allergen Reactions    Citalopram Other (See Comments)     sexual      Gabapentin Other (See Comments)    Paroxetine Other (See Comments)     sexual      Sertraline Headache and Nausea     felt worse when on.         There is no immunization history on file for this patient.    History reviewed. No pertinent past medical history.    History reviewed. No pertinent surgical history.    SOCIAL HISTORY  History   Smoking Status    Never   Smokeless Tobacco    Never    Social History    Substance and Sexual Activity      Alcohol use: Not Currently     History   Drug Use Unknown       FAMILY HISTORY  Family History   Problem Relation Age of Onset    Colon Cancer Brother        PHYSICAL EXAMINATION  /82 (BP Location: Right arm, Patient Position: Sitting, Cuff Size: Adult Regular)   Pulse 58   Temp 97.7  F (36.5  C) (Tympanic)   Resp 16   Ht 1.727 m (5' 8\")   Wt 78.9 kg (174 lb)   SpO2 97%   BMI 26.46 kg/m    Wt Readings from Last 2 Encounters:   07/08/25 78.9 " kg (174 lb)   06/02/22 77.6 kg (171 lb)     Physical Exam    ASSESSMENT AND PLAN    1.  Polycythemia:    Initially seen to have hemoglobin ranging between mid 14 to mid 16's up to 2019.  Was then found to have increasing hemoglobin.  This was around the time that he started testosterone replacement.    He was evaluated by Dr. Hua at that time he had also complained of symptoms like fatigue and pruritus and therefore was started on [email or web address].h in February 2023.     In August 2023 he was admitted with numbness and tingling in his face, weakness in his left upper extremity.  Imaging done at the time did not reveal a stroke.  His phlebotomy parameters were changed to 16 and above and he has been getting phlebotomy once a month since then.    He did have a JAK2 mutation checked in the past which was negative.    In the light of the negative JAK2 mutation this is secondary polycythemia.  As he is on testosterone this is very likely the cause of his polycythemia.    At this time he is on phlebotomy which I agree he should be given his prior history of possible TIA.  However the question is whether he needs the current diameter of hemoglobin of 16 g/dL or above.  I would recommend changing it to 17 g/dL or above to see if some of his symptoms of dizziness and fatigue improve.     his lab in clinic today shows a hemoglobin of 15.1.  No indication for phlebotomy currently.  Will plan to recheck in 1 month and then do phlebotomy if hemoglobin is greater than 17.  He is iron deficient which is likely a consequence of his phlebotomy.  If he replaced the iron he will have improvement in his hemoglobin which would be counterproductive.  Therefore we will monitor for now.    Will see him back in clinic in 1 month to see how he is doing.    Total time spent on the patient on day of encounter was 60 minutes doing chart review, review of test results, interpretation of results, patient visit and documentation.       Namita FERNANDEZ  MD [facility]

## 2025-07-08 NOTE — NURSING NOTE
"Oncology Rooming Note    July 8, 2025 9:01 AM   Derrick Andrade is a 54 year old male who presents for:    Chief Complaint   Patient presents with    Consult     Polycythemia     Initial Vitals: /82 (BP Location: Right arm, Patient Position: Sitting, Cuff Size: Adult Regular)   Pulse 58   Temp 97.7  F (36.5  C) (Tympanic)   Resp 16   Ht 1.727 m (5' 8\")   Wt 78.9 kg (174 lb)   SpO2 97%   BMI 26.46 kg/m   Estimated body mass index is 26.46 kg/m  as calculated from the following:    Height as of this encounter: 1.727 m (5' 8\").    Weight as of this encounter: 78.9 kg (174 lb). Body surface area is 1.95 meters squared.  Moderate Pain (5) Comment: Data Unavailable   No LMP for male patient.  Allergies reviewed: Yes  Medications reviewed: Yes    Medications: Medication refills not needed today.  Pharmacy name entered into [facility]: [id number] - [place], [place] - [address]    Frailty Screening:   Is the patient here for a new oncology consult visit in cancer care? 1. Yes. Over the past month, have you experienced difficulty or required a caregiver to assist with:   1. Balance, walking or general mobility (including any falls)? NO  2. Completion of self-care tasks such as bathing, dressing, toileting, grooming/hygiene?  NO  3. Concentration or memory that affects your daily life?  NO     PHQ9:  Did this patient require a PHQ9?: No      Clinical concerns: Patient requesting second opinion for his blood disorder. Hemoglobin has been creeping upward and needs phlebotomies.      Magy Styles, [facility]              "

## 2025-07-08 NOTE — Clinical Note
Jazlyn can you please put in labs for phlebotomy-500 cc to be taken with every phlebotomy monthly for hemoglobin greater than 17.  Please put in a hemoglobin order in.  Also let him know no phlebotomy required based on labs done 7/8/2025.  He is iron deficient however we will not replace it as it will increase his hemoglobin and be counterproductive

## 2025-07-09 NOTE — NURSING NOTE
Spoke with patient and per Dr. Yang phlebotomy required based on labs done 7/8/2025.  He is iron deficient however we will not replace it as it will increase his hemoglobin and be counterproductive   Jazlyn Lemus RN...........7/9/2025 8:24 AM

## 2025-07-12 ENCOUNTER — HEALTH MAINTENANCE LETTER (OUTPATIENT)
Age: 55
End: 2025-07-12

## 2025-08-06 ENCOUNTER — LAB (OUTPATIENT)
Dept: LAB | Facility: OTHER | Age: 55
End: 2025-08-06
Payer: MEDICARE

## 2025-08-06 ENCOUNTER — TELEPHONE (OUTPATIENT)
Dept: ONCOLOGY | Facility: OTHER | Age: 55
End: 2025-08-06

## 2025-08-06 DIAGNOSIS — D75.1 POLYCYTHEMIA: ICD-10-CM

## 2025-08-06 LAB
HGB BLD-MCNC: 15.4 G/DL (ref 13.3–17.7)
MCV RBC AUTO: 83 FL (ref 78–100)

## 2025-08-06 PROCEDURE — 36415 COLL VENOUS BLD VENIPUNCTURE: CPT | Mod: ZL

## 2025-08-06 PROCEDURE — 85018 HEMOGLOBIN: CPT | Mod: ZL

## 2025-08-27 ENCOUNTER — MYC MEDICAL ADVICE (OUTPATIENT)
Dept: ONCOLOGY | Facility: OTHER | Age: 55
End: 2025-08-27
Payer: COMMERCIAL

## 2025-08-27 DIAGNOSIS — D75.1 POLYCYTHEMIA: Primary | ICD-10-CM

## 2025-08-28 ENCOUNTER — LAB (OUTPATIENT)
Dept: LAB | Facility: OTHER | Age: 55
End: 2025-08-28
Payer: MEDICARE

## 2025-08-29 DIAGNOSIS — R71.8 OTHER ABNORMALITY OF RED BLOOD CELLS: ICD-10-CM

## 2025-08-29 DIAGNOSIS — D75.1 POLYCYTHEMIA: Primary | ICD-10-CM
